# Patient Record
Sex: MALE | Race: WHITE | NOT HISPANIC OR LATINO | Employment: OTHER | URBAN - NONMETROPOLITAN AREA
[De-identification: names, ages, dates, MRNs, and addresses within clinical notes are randomized per-mention and may not be internally consistent; named-entity substitution may affect disease eponyms.]

---

## 2017-02-07 ENCOUNTER — LAB (OUTPATIENT)
Dept: LAB | Facility: HOSPITAL | Age: 72
End: 2017-02-07
Attending: INTERNAL MEDICINE

## 2017-02-07 ENCOUNTER — OFFICE VISIT (OUTPATIENT)
Dept: CARDIOLOGY | Facility: CLINIC | Age: 72
End: 2017-02-07

## 2017-02-07 VITALS
RESPIRATION RATE: 18 BRPM | HEART RATE: 83 BPM | OXYGEN SATURATION: 98 % | DIASTOLIC BLOOD PRESSURE: 77 MMHG | SYSTOLIC BLOOD PRESSURE: 110 MMHG

## 2017-02-07 DIAGNOSIS — E78.5 DYSLIPIDEMIA: ICD-10-CM

## 2017-02-07 DIAGNOSIS — I10 ESSENTIAL HYPERTENSION: ICD-10-CM

## 2017-02-07 DIAGNOSIS — I25.10 ASCVD (ARTERIOSCLEROTIC CARDIOVASCULAR DISEASE): Primary | ICD-10-CM

## 2017-02-07 DIAGNOSIS — I25.10 ASCVD (ARTERIOSCLEROTIC CARDIOVASCULAR DISEASE): ICD-10-CM

## 2017-02-07 DIAGNOSIS — R25.2 CRAMP OF BOTH LOWER EXTREMITIES: ICD-10-CM

## 2017-02-07 DIAGNOSIS — R00.1 BRADYCARDIA: ICD-10-CM

## 2017-02-07 LAB
ALBUMIN SERPL-MCNC: 4.2 G/DL (ref 3.4–4.8)
ALBUMIN/GLOB SERPL: 1.3 G/DL (ref 1.5–2.5)
ALP SERPL-CCNC: 81 U/L (ref 46–116)
ALT SERPL W P-5'-P-CCNC: 24 U/L (ref 10–44)
ANION GAP SERPL CALCULATED.3IONS-SCNC: 6.3 MMOL/L (ref 3.6–11.2)
AST SERPL-CCNC: 20 U/L (ref 10–34)
BILIRUB SERPL-MCNC: 0.5 MG/DL (ref 0.2–1.8)
BUN BLD-MCNC: 23 MG/DL (ref 7–21)
BUN/CREAT SERPL: 17.3 (ref 7–25)
CALCIUM SPEC-SCNC: 9.4 MG/DL (ref 7.7–10)
CHLORIDE SERPL-SCNC: 102 MMOL/L (ref 99–112)
CHOLEST SERPL-MCNC: 225 MG/DL (ref 0–200)
CO2 SERPL-SCNC: 29.7 MMOL/L (ref 24.3–31.9)
CREAT BLD-MCNC: 1.33 MG/DL (ref 0.43–1.29)
GFR SERPL CREATININE-BSD FRML MDRD: 53 ML/MIN/1.73
GLOBULIN UR ELPH-MCNC: 3.3 GM/DL
GLUCOSE BLD-MCNC: 122 MG/DL (ref 70–110)
HDLC SERPL-MCNC: 41 MG/DL (ref 60–100)
LDLC SERPL CALC-MCNC: 134 MG/DL (ref 0–100)
LDLC/HDLC SERPL: 3.26 {RATIO}
OSMOLALITY SERPL CALC.SUM OF ELEC: 280.7 MOSM/KG (ref 273–305)
POTASSIUM BLD-SCNC: 4.3 MMOL/L (ref 3.5–5.3)
PROT SERPL-MCNC: 7.5 G/DL (ref 6–8)
SODIUM BLD-SCNC: 138 MMOL/L (ref 135–153)
TRIGL SERPL-MCNC: 252 MG/DL (ref 0–150)
VLDLC SERPL-MCNC: 50.4 MG/DL

## 2017-02-07 PROCEDURE — 36415 COLL VENOUS BLD VENIPUNCTURE: CPT

## 2017-02-07 PROCEDURE — 93000 ELECTROCARDIOGRAM COMPLETE: CPT | Performed by: INTERNAL MEDICINE

## 2017-02-07 PROCEDURE — 99213 OFFICE O/P EST LOW 20 MIN: CPT | Performed by: INTERNAL MEDICINE

## 2017-02-07 PROCEDURE — 80053 COMPREHEN METABOLIC PANEL: CPT | Performed by: INTERNAL MEDICINE

## 2017-02-07 PROCEDURE — 80061 LIPID PANEL: CPT | Performed by: INTERNAL MEDICINE

## 2017-02-07 RX ORDER — ATORVASTATIN CALCIUM 80 MG/1
80 TABLET, FILM COATED ORAL DAILY
Qty: 30 TABLET | Refills: 11 | Status: SHIPPED | OUTPATIENT
Start: 2017-02-07 | End: 2017-02-07 | Stop reason: SDUPTHER

## 2017-02-07 RX ORDER — ATORVASTATIN CALCIUM 80 MG/1
80 TABLET, FILM COATED ORAL DAILY
Qty: 30 TABLET | Refills: 3 | Status: SHIPPED | OUTPATIENT
Start: 2017-02-07 | End: 2018-04-03 | Stop reason: SDUPTHER

## 2017-02-07 NOTE — PROGRESS NOTES
KARTIK Og  Chi Stanton  : 1945  DATE:2016    Patient Active Problem List   Diagnosis   • Hypertension   • Benign prostatic hyperplasia with urinary obstruction   • Basal cell carcinoma of skin   • Dyslipidemia   • ASCVD (arteriosclerotic cardiovascular disease)   • Bradycardia   • Cramp of both lower extremities       Dear Duran Rm APRN:    Subjective     Chi Stanton is a 71 y.o. male with the above medical problems who is being seen for consultation today follow up.  Current to the patient has been doing well since his last visit.  He denies any chest pain, shortness of breath, palpitations, orthopnea, PND or lower extremity edema.  He was previously seen for chronic bradycardia which his metoprolol was discontinued.  His bradycardia appears to be resolved at this point.  He was also seen for hypertension which appears to be significantly improved after medication adjustment.  He has a history of ASCVD for which she is currently asymptomatic.  He was also seen for leg cramping which appears to be resolved at this point.  He was supposed to have a CMP done which she did not complete.        Current Outpatient Prescriptions:   •  aspirin (ASPIRIN LOW DOSE) 81 MG tablet, Take 81 mg by mouth Daily., Disp: , Rfl:   •  chlorthalidone (HYGROTEN) 25 MG tablet, Take 1 tablet by mouth Daily., Disp: 30 tablet, Rfl: 6  •  levothyroxine (SYNTHROID, LEVOTHROID) 75 MCG tablet, Take 75 mcg by mouth Daily., Disp: , Rfl:   •  pravastatin (PRAVACHOL) 40 MG tablet, Take 40 mg by mouth Daily., Disp: , Rfl:   •  ramipril (ALTACE) 10 MG capsule, Ramipril 10 MG Oral Capsule; Patient Sig: Ramipril 10 MG Oral Capsule TAKE 1 CAPSULE EVERY 12 HOURS DAILY.; 0; -2015; Active, Disp: , Rfl:     The following portions of the patient's history were reviewed and updated as appropriate: allergies, current medications, past family history, past medical history, past social history, past surgical  history and problem list.    Review of Systems   Constitution: Negative for diaphoresis, fever, weakness and malaise/fatigue.   HENT: Positive for congestion and hearing loss. Negative for ear pain, headaches, hoarse voice and nosebleeds.    Eyes: Negative for blurred vision, discharge, pain and redness.   Cardiovascular: Negative for chest pain, dyspnea on exertion, irregular heartbeat, leg swelling and palpitations.   Respiratory: Negative for cough, hemoptysis, shortness of breath and wheezing.    Endocrine: Negative for cold intolerance and heat intolerance.   Hematologic/Lymphatic: Does not bruise/bleed easily.   Skin: Positive for skin cancer (neck, arm, lip). Negative for rash.   Musculoskeletal: Positive for gout (Lt great toe ) and joint swelling (with prolonged sitting). Negative for arthritis, back pain, joint pain, muscle cramps, muscle weakness, neck pain and stiffness.   Gastrointestinal: Negative for abdominal pain, change in bowel habit, constipation, diarrhea, heartburn, hematemesis, hematochezia, melena, nausea and vomiting.   Genitourinary: Positive for nocturia (couple times / night). Negative for dysuria, frequency and hematuria.   Neurological: Negative for dizziness, focal weakness and numbness.   Psychiatric/Behavioral: Positive for depression. The patient is not nervous/anxious.    Allergic/Immunologic: Negative for hives.       Objective   Blood pressure 110/77, pulse 83, resp. rate 18, SpO2 98 %.    Physical Exam   Constitutional: He is oriented to person, place, and time. He appears well-developed and well-nourished.   WM sitting comfortably on chair.   HENT:   Mouth/Throat: Oropharynx is clear and moist.   Eyes: EOM are normal. Pupils are equal, round, and reactive to light.   Neck: Neck supple. No JVD present. No tracheal deviation present. No thyromegaly present.   Cardiovascular: Normal rate, regular rhythm, S1 normal and S2 normal.  Exam reveals no gallop and no friction rub.    No  murmur heard.  Pulmonary/Chest: Effort normal and breath sounds normal. No respiratory distress. He has no wheezes. He has no rales.   Abdominal: Soft. Bowel sounds are normal. He exhibits no mass. There is no tenderness.   Musculoskeletal: Normal range of motion. He exhibits no edema.   Lymphadenopathy:     He has no cervical adenopathy.   Neurological: He is alert and oriented to person, place, and time.   Skin: Skin is warm and dry. No rash noted.   Psychiatric: He has a normal mood and affect.         ECG 12 Lead  Date/Time: 2/7/2017 9:48 AM  Performed by: LISA AGUIRRE  Authorized by: LISA AGUIRRE   Comparison: compared with previous ECG from 11/8/2016  Comparison to previous ECG: Patient now in NSR  Rhythm: sinus rhythm  Rate: normal  BPM: 76  Conduction: conduction normal  ST Segments: ST segments normal  T Waves: T waves normal  QRS axis: normal  Q waves: II, III and aVF  Clinical impression: non-specific ECG            Assessment/Plan      1.  ASCVD: Patient with history of ASCVD status post coronary artery bypass grafting 14 years ago.  At this point he denies any chest pain, shortness of breath or palpitations.  He is currently on aspirin, ACE inhibitor and statin.  His beta blocker was discontinued in light of his bradycardia with significant improvement.  At this point would continue current regimen as he remains asymptomatic.  He did not complete previously ordered CMP.  We will reorder.    2.  Bradycardia: Now resolved after discontinuing beta blocker.  We will continue current regimen and monitor.    3.  Hypertension: The patient's blood pressure is now well controlled on current regimen.  At this point will continue.    4.  Dyslipidemia: Patient with history of dyslipidemia with no recent lipid panel record.  He did not complete the previously ordered lipid panel.  At this point will reorder.    5.  Lower extremity cramping: The patient's leg cramping is now  resolved.  At this point we'll continue to monitor.     Diagnosis Plan   1. ASCVD (arteriosclerotic cardiovascular disease)     2. Bradycardia     3. Essential hypertension     4. Dyslipidemia     5. Cramp of both lower extremities              Return in about 3 months (around 5/7/2017).    I appreciate the opportunity to participate in this patient's cardiovascular care.    Best Regards    Dilan Maldonado

## 2017-05-09 ENCOUNTER — OFFICE VISIT (OUTPATIENT)
Dept: CARDIOLOGY | Facility: CLINIC | Age: 72
End: 2017-05-09

## 2017-05-09 VITALS
DIASTOLIC BLOOD PRESSURE: 83 MMHG | HEART RATE: 44 BPM | HEIGHT: 76 IN | SYSTOLIC BLOOD PRESSURE: 139 MMHG | BODY MASS INDEX: 28.67 KG/M2 | WEIGHT: 235.4 LBS

## 2017-05-09 DIAGNOSIS — R00.1 BRADYCARDIA: ICD-10-CM

## 2017-05-09 DIAGNOSIS — I10 ESSENTIAL HYPERTENSION: Primary | ICD-10-CM

## 2017-05-09 DIAGNOSIS — E78.5 DYSLIPIDEMIA: ICD-10-CM

## 2017-05-09 DIAGNOSIS — I25.10 ASCVD (ARTERIOSCLEROTIC CARDIOVASCULAR DISEASE): ICD-10-CM

## 2017-05-09 PROCEDURE — 99213 OFFICE O/P EST LOW 20 MIN: CPT | Performed by: INTERNAL MEDICINE

## 2017-05-09 PROCEDURE — 93000 ELECTROCARDIOGRAM COMPLETE: CPT | Performed by: INTERNAL MEDICINE

## 2017-05-09 RX ORDER — AMLODIPINE BESYLATE 5 MG/1
5 TABLET ORAL
Status: DISCONTINUED | OUTPATIENT
Start: 2017-05-09 | End: 2017-08-08

## 2017-05-23 RX ORDER — CHLORTHALIDONE 25 MG/1
TABLET ORAL
Qty: 90 TABLET | Refills: 6 | Status: SHIPPED | OUTPATIENT
Start: 2017-05-23 | End: 2018-09-19 | Stop reason: SDUPTHER

## 2017-08-08 ENCOUNTER — OFFICE VISIT (OUTPATIENT)
Dept: CARDIOLOGY | Facility: CLINIC | Age: 72
End: 2017-08-08

## 2017-08-08 VITALS
BODY MASS INDEX: 28.86 KG/M2 | WEIGHT: 237 LBS | HEART RATE: 52 BPM | HEIGHT: 76 IN | DIASTOLIC BLOOD PRESSURE: 79 MMHG | RESPIRATION RATE: 16 BRPM | SYSTOLIC BLOOD PRESSURE: 125 MMHG

## 2017-08-08 DIAGNOSIS — I25.10 ASCVD (ARTERIOSCLEROTIC CARDIOVASCULAR DISEASE): ICD-10-CM

## 2017-08-08 DIAGNOSIS — E78.5 DYSLIPIDEMIA: ICD-10-CM

## 2017-08-08 DIAGNOSIS — R00.1 BRADYCARDIA: ICD-10-CM

## 2017-08-08 DIAGNOSIS — I10 ESSENTIAL HYPERTENSION: Primary | ICD-10-CM

## 2017-08-08 PROCEDURE — 99212 OFFICE O/P EST SF 10 MIN: CPT | Performed by: INTERNAL MEDICINE

## 2017-08-08 NOTE — PROGRESS NOTES
KARTIK Og  Chi Stanton  : 1945  DATE:2016    Patient Active Problem List   Diagnosis   • Hypertension   • Benign prostatic hyperplasia with urinary obstruction   • Basal cell carcinoma of skin   • Dyslipidemia   • ASCVD (arteriosclerotic cardiovascular disease)   • Bradycardia   • Cramp of both lower extremities       Dear Duran Rm, APRN:    Subjective     Chi Stanton is a 71 y.o. male with the above medical problems who is being seen for follow up. The patient states he has been doing well since his last visit and denies any symptoms at this point.  He denies any chest pain, shortness breath, palpitations, orthopnea, PND, nausea and edema, dizziness or syncope.  He has a history of bradycardia but this is noted to have improved after discontinuing metoprolol.      Current Outpatient Prescriptions:   •  aspirin (ASPIRIN LOW DOSE) 81 MG tablet, Take 81 mg by mouth Daily., Disp: , Rfl:   •  atorvastatin (LIPITOR) 80 MG tablet, Take 1 tablet by mouth Daily., Disp: 30 tablet, Rfl: 3  •  chlorthalidone (HYGROTON) 25 MG tablet, TAKE 1 TABLET EVERY DAY, Disp: 90 tablet, Rfl: 6  •  levothyroxine (SYNTHROID, LEVOTHROID) 75 MCG tablet, Take 75 mcg by mouth Daily., Disp: , Rfl:   •  ramipril (ALTACE) 10 MG capsule, Ramipril 10 MG Oral Capsule; Patient Sig: Ramipril 10 MG Oral Capsule TAKE 1 CAPSULE EVERY 12 HOURS DAILY.; 0; -2015; Active, Disp: , Rfl:     Current Facility-Administered Medications:   •  amLODIPine (NORVASC) tablet 5 mg, 5 mg, Oral, Q24H, Dilan Herring MD    The following portions of the patient's history were reviewed and updated as appropriate: allergies, current medications, past family history, past medical history, past social history, past surgical history and problem list.    Review of Systems   Constitution: Negative for chills and fever.   HENT: Negative for headaches, nosebleeds and sore throat.    Eyes: Negative for blurred  "vision, pain and redness.   Cardiovascular: Negative for chest pain, leg swelling, near-syncope, orthopnea, palpitations and syncope.   Respiratory: Negative for cough, hemoptysis, shortness of breath and wheezing.    Musculoskeletal: Negative for arthritis, back pain, joint pain, joint swelling, myalgias and stiffness.   Gastrointestinal: Negative for abdominal pain, constipation, hematemesis, hematochezia, melena, nausea and vomiting.   Genitourinary: Negative for dysuria and hematuria.   Neurological: Negative for dizziness.   Psychiatric/Behavioral: Negative for depression. The patient is not nervous/anxious.        Objective   Blood pressure 125/79, pulse 52, resp. rate 16, height 76\" (193 cm), weight 237 lb (108 kg).    Physical Exam   Constitutional: He is oriented to person, place, and time. He appears well-developed and well-nourished.   WM sitting comfortably on chair.   HENT:   Mouth/Throat: Oropharynx is clear and moist.   Eyes: EOM are normal. Pupils are equal, round, and reactive to light.   Neck: Neck supple. No JVD present. No tracheal deviation present. No thyromegaly present.   Cardiovascular: Regular rhythm, S1 normal and S2 normal.  Bradycardia present.  Exam reveals no gallop and no friction rub.    No murmur heard.  Pulmonary/Chest: Effort normal and breath sounds normal. No respiratory distress. He has no wheezes. He has no rales.   Abdominal: Soft. Bowel sounds are normal. He exhibits no mass. There is no tenderness.   Musculoskeletal: Normal range of motion. He exhibits no edema.   Lymphadenopathy:     He has no cervical adenopathy.   Neurological: He is alert and oriented to person, place, and time.   Skin: Skin is warm and dry. No rash noted.   Psychiatric: He has a normal mood and affect.       Procedures    Assessment/Plan      1.  ASCVD: Patient with history of ASCVD status post coronary artery bypass grafting 14 years ago.  At this point he denies any chest pain, shortness of breath or " palpitations.  He is currently on aspirin, ACE inhibitor and statin.  This point will continue to hold metoprolol due to recent bradycardia.    2.  Bradycardia: Patient with a long history of bradycardia that appears to have improved after discontinuing metoprolol.  At this point we'll continue to hold.    3.  Hypertension: Patient with a history of hypertension that appears to be well-controlled on current regimen.  At this point will continue.    4.  Dyslipidemia: Patient with history of dyslipidemia.  We will check lipid panel.     Diagnosis Plan   1. Essential hypertension     2. Bradycardia     3. ASCVD (arteriosclerotic cardiovascular disease)     4. Dyslipidemia  Lipid Panel            Return in about 6 months (around 2/8/2018).    I appreciate the opportunity to participate in this patient's cardiovascular care.    Best Regards    Dilan Maldonado

## 2017-10-06 ENCOUNTER — DOCUMENTATION (OUTPATIENT)
Dept: CARDIOLOGY | Facility: CLINIC | Age: 72
End: 2017-10-06

## 2018-03-14 ENCOUNTER — OFFICE VISIT (OUTPATIENT)
Dept: SURGERY | Facility: CLINIC | Age: 73
End: 2018-03-14

## 2018-03-14 VITALS — BODY MASS INDEX: 29.22 KG/M2 | WEIGHT: 240 LBS | HEIGHT: 76 IN

## 2018-03-14 DIAGNOSIS — C44.519 BASAL CELL CARCINOMA OF SKIN OF OTHER PART OF TRUNK: Primary | ICD-10-CM

## 2018-03-14 PROCEDURE — 99213 OFFICE O/P EST LOW 20 MIN: CPT | Performed by: SURGERY

## 2018-03-16 NOTE — PROGRESS NOTES
Rony Stanton is a 72 y.o. male is being seen for consultation today at the request of Duran Rm    72 y.o. male presenting for evaluation of skin lesion. Lesion on Left ear tragus, face, left chest, left neck with patient's observations stated as increasing diameter, increasing thickness, exam of this area shows possible basal cell carcinoma, features Ulcerated irregularly bordered red lesions in all locations., size Of all lesions is 10 mm.    Past Medical History:   Diagnosis Date   • Hypertension        History reviewed. No pertinent family history.    Social History     Social History   • Marital status:      Spouse name: N/A   • Number of children: N/A   • Years of education: N/A     Occupational History   • Not on file.     Social History Main Topics   • Smoking status: Former Smoker   • Smokeless tobacco: Never Used      Comment: QUIT FOR 20 YEARS   • Alcohol use No   • Drug use: No   • Sexual activity: Defer     Other Topics Concern   • Not on file     Social History Narrative   • No narrative on file       Past Surgical History:   Procedure Laterality Date   • BYPASS GRAFT     • CORONARY ARTERY BYPASS GRAFT     • SKIN CANCER EXCISION      Lip, Neck, Arm        The following portions of the patient's history were reviewed and updated as appropriate: allergies, current medications, past family history, past medical history, past social history, past surgical history and problem list.    Review of Systems   Constitutional: Negative for activity change, appetite change, chills and fever.   HENT: Negative for sore throat and trouble swallowing.    Eyes: Negative for visual disturbance.   Respiratory: Negative for cough and shortness of breath.    Cardiovascular: Negative for chest pain and palpitations.   Gastrointestinal: Negative for abdominal distention, abdominal pain, blood in stool, constipation, diarrhea, nausea and vomiting.   Endocrine: Negative for cold intolerance and heat  "intolerance.   Genitourinary: Negative for dysuria.   Musculoskeletal: Negative for joint swelling.   Skin: Negative for color change, rash and wound.   Allergic/Immunologic: Negative for immunocompromised state.   Neurological: Negative for dizziness, seizures, weakness and headaches.   Hematological: Negative for adenopathy. Does not bruise/bleed easily.   Psychiatric/Behavioral: Negative for agitation and confusion.         Ht 193 cm (76\")   Wt 109 kg (240 lb)   BMI 29.21 kg/m²   Objective   Physical Exam   Constitutional: He is oriented to person, place, and time. He appears well-developed and well-nourished.   HENT:   Head: Normocephalic and atraumatic.   Mouth/Throat: Mucous membranes are normal.   Eyes: Conjunctivae and EOM are normal. Pupils are equal, round, and reactive to light. No scleral icterus.   Neck: Neck supple. No JVD present. No tracheal deviation present. No thyromegaly present.   Cardiovascular: Normal rate and regular rhythm.  Exam reveals no gallop and no friction rub.    No murmur heard.  Pulmonary/Chest: Effort normal and breath sounds normal. He exhibits no mass, no tenderness and no retraction. Right breast exhibits no mass, no nipple discharge, no skin change and no tenderness. Left breast exhibits no mass, no nipple discharge, no skin change and no tenderness. Breasts are symmetrical.   Abdominal: Soft. Bowel sounds are normal. He exhibits no distension and no mass. There is no splenomegaly or hepatomegaly. There is no tenderness. No hernia.   Musculoskeletal: Normal range of motion. He exhibits no deformity.   Lymphadenopathy:     He has no cervical adenopathy.     He has no axillary adenopathy.   Neurological: He is alert and oriented to person, place, and time.   Skin: Skin is warm and dry. No rash noted.   Multiple suspicious basal cell carcinomas already 1 cm of the left tragus left neck left chest and face   Psychiatric: He has a normal mood and affect.   Vitals " reviewed.        Chi was seen today for multiple skin lesion.    Diagnoses and all orders for this visit:    Basal cell carcinoma of skin of other part of trunk        Assessment     72-year-old male with multiple skin lesions concerning for malignancy with 1 biopsy-proven basal cell carcinoma.  The lesion on the ear at the tragus will be a complex excision and he'll be referred to her nose and throat for evaluation of this prior to removal of the other lesions.  Discussed the patient's BMI with him. BMI is within normal parameters. No follow-up required.

## 2018-03-21 ENCOUNTER — PROCEDURE VISIT (OUTPATIENT)
Dept: SURGERY | Facility: CLINIC | Age: 73
End: 2018-03-21

## 2018-03-21 VITALS — HEIGHT: 76 IN | WEIGHT: 240 LBS | BODY MASS INDEX: 29.22 KG/M2

## 2018-03-21 DIAGNOSIS — C44.519 BASAL CELL CARCINOMA OF SKIN OF OTHER PART OF TRUNK: Primary | ICD-10-CM

## 2018-03-21 PROCEDURE — 11100 PR BIOPSY OF SKIN LESION: CPT | Performed by: SURGERY

## 2018-03-21 PROCEDURE — 11101 PR BIOPSY, EACH ADDED LESION: CPT | Performed by: SURGERY

## 2018-03-26 ENCOUNTER — TELEPHONE (OUTPATIENT)
Dept: CARDIOLOGY | Facility: CLINIC | Age: 73
End: 2018-03-26

## 2018-03-27 ENCOUNTER — OFFICE VISIT (OUTPATIENT)
Dept: SURGERY | Facility: CLINIC | Age: 73
End: 2018-03-27

## 2018-03-27 ENCOUNTER — TELEPHONE (OUTPATIENT)
Dept: SURGERY | Facility: CLINIC | Age: 73
End: 2018-03-27

## 2018-03-27 ENCOUNTER — LAB (OUTPATIENT)
Dept: LAB | Facility: HOSPITAL | Age: 73
End: 2018-03-27
Attending: INTERNAL MEDICINE

## 2018-03-27 VITALS — HEIGHT: 76 IN | BODY MASS INDEX: 29.22 KG/M2 | WEIGHT: 240 LBS

## 2018-03-27 DIAGNOSIS — E78.5 DYSLIPIDEMIA: ICD-10-CM

## 2018-03-27 DIAGNOSIS — C44.519 BASAL CELL CARCINOMA OF SKIN OF OTHER PART OF TRUNK: Primary | ICD-10-CM

## 2018-03-27 LAB
CHOLEST SERPL-MCNC: 110 MG/DL (ref 0–200)
HDLC SERPL-MCNC: 28 MG/DL (ref 60–100)
LDLC SERPL CALC-MCNC: 39 MG/DL (ref 0–100)
LDLC/HDLC SERPL: 1.38 {RATIO}
TRIGL SERPL-MCNC: 217 MG/DL (ref 0–150)
VLDLC SERPL-MCNC: 43.4 MG/DL

## 2018-03-27 PROCEDURE — 99212 OFFICE O/P EST SF 10 MIN: CPT | Performed by: SURGERY

## 2018-03-27 PROCEDURE — 80061 LIPID PANEL: CPT

## 2018-03-27 PROCEDURE — 36415 COLL VENOUS BLD VENIPUNCTURE: CPT

## 2018-03-27 NOTE — PROGRESS NOTES
Subjective   Chi Stanton is a 72 y.o. male  is here today for follow-up.         Chi Stanton is a 72 y.o. male here for follow up after Punch biopsy of multiple skin lesions of the face and trunk.  All lesions of come back as basal cell carcinoma and will require excision.  No change in the lesions in the biopsy sites of healed well.    Physical Examination:  Left cheek, right forehead, left chest, right forearm, and right upper back lesions consistent with basal Cell Carcinoma.  Biopsy Sites Well-Healed.      Chi was seen today for suture / staple removal and pathology review.    Diagnoses and all orders for this visit:    Basal cell carcinoma of skin of other part of trunk        Assessment     Chi Stanton is a 72 y.o. male with biopsy-proven basal cell carcinoma of multiple sites.  He will have in office excision in 2 weeks.

## 2018-03-27 NOTE — TELEPHONE ENCOUNTER
Patient's spouse was aware of his appointment with ENT for removal of other ear lesions. Appointment was made for 4/2/18 @ 11:15, phone number [phone] was given to the patient as well as address.

## 2018-04-04 RX ORDER — ATORVASTATIN CALCIUM 80 MG/1
TABLET, FILM COATED ORAL
Qty: 90 TABLET | Refills: 11 | Status: SHIPPED | OUTPATIENT
Start: 2018-04-04 | End: 2018-11-02 | Stop reason: SDUPTHER

## 2018-04-11 ENCOUNTER — PROCEDURE VISIT (OUTPATIENT)
Dept: SURGERY | Facility: CLINIC | Age: 73
End: 2018-04-11

## 2018-04-11 VITALS — HEIGHT: 76 IN | BODY MASS INDEX: 29.22 KG/M2 | WEIGHT: 240 LBS

## 2018-04-11 DIAGNOSIS — C44.519 BASAL CELL CARCINOMA OF SKIN OF OTHER PART OF TRUNK: Primary | ICD-10-CM

## 2018-04-11 PROCEDURE — 11604 EXC TR-EXT MAL+MARG 3.1-4 CM: CPT | Performed by: SURGERY

## 2018-04-11 PROCEDURE — 11622 EXC S/N/H/F/G MAL+MRG 1.1-2: CPT | Performed by: SURGERY

## 2018-04-11 PROCEDURE — 11642 EXC F/E/E/N/L MAL+MRG 1.1-2: CPT | Performed by: SURGERY

## 2018-04-14 NOTE — PROGRESS NOTES
Excision of multiple skin lesions (left chest, left eye, right forehead)    Patient face and left chest prepped and draped in sterile fashion.  At all sites elliptical incisions with 3mm margins were made and the skin and subcutaneous fat were excised.  The measurements of the lesions were (left chest 1.5cm, left eye 8mm, left scalp 8mm).  The incisions were hemostatic and closed with running nylon suture.  The patient was dressed with bacitracin and band aids.    Complications: none  -  EBL: 10mL    Specimens: left chest skin lesion                      Left cheek skin lesion                      Right scalp skin lesion

## 2018-04-17 RX ORDER — SULFAMETHOXAZOLE AND TRIMETHOPRIM 800; 160 MG/1; MG/1
1 TABLET ORAL 2 TIMES DAILY
Qty: 20 TABLET | Refills: 0 | Status: SHIPPED | OUTPATIENT
Start: 2018-04-17 | End: 2018-07-24

## 2018-04-18 ENCOUNTER — OFFICE VISIT (OUTPATIENT)
Dept: SURGERY | Facility: CLINIC | Age: 73
End: 2018-04-18

## 2018-04-18 VITALS — BODY MASS INDEX: 29.22 KG/M2 | HEIGHT: 76 IN | WEIGHT: 240 LBS

## 2018-04-18 DIAGNOSIS — C44.519 BASAL CELL CARCINOMA OF SKIN OF OTHER PART OF TRUNK: Primary | ICD-10-CM

## 2018-04-18 PROCEDURE — 99024 POSTOP FOLLOW-UP VISIT: CPT | Performed by: SURGERY

## 2018-04-19 NOTE — PROGRESS NOTES
Subjective   Chi Stanton is a 72 y.o. male  is here today for follow-up.         Chi Stanton is a 72 y.o. male here for follow up after excision of multiple basal cell carcinoma of the skin.  He is healing well at the fascial lesions and right chest lesion.  The left chest lesion which was the largest of the 4 was unfortunately developed infection.  The sutures been removed and the wound is been opened and purulence drained.  He has no systemic symptoms concerning for sepsis or bacteremia.          Chi was seen today for pathology review and post skin lesion excision.    Diagnoses and all orders for this visit:    Basal cell carcinoma of skin of other part of trunk        Assessment     Chi Stanton is a 72 y.o. male status post excision of multiple basal cell carcinoma.  All margins are negative.  The largest of the incisions was in the left chest and has developed subcutaneous infection requiring opening of the wound with irrigation.  No systemic symptoms evident.  He will pack the wound twice daily and follow-up as scheduled for suture removal of the remaining lesions.  All margins were negative and he will be initiated on Bactrim therapy.

## 2018-04-25 ENCOUNTER — OFFICE VISIT (OUTPATIENT)
Dept: SURGERY | Facility: CLINIC | Age: 73
End: 2018-04-25

## 2018-04-25 VITALS — WEIGHT: 241 LBS | BODY MASS INDEX: 29.35 KG/M2 | HEIGHT: 76 IN

## 2018-04-25 DIAGNOSIS — C44.519 BASAL CELL CARCINOMA OF SKIN OF OTHER PART OF TRUNK: Primary | ICD-10-CM

## 2018-04-25 PROCEDURE — 99212 OFFICE O/P EST SF 10 MIN: CPT | Performed by: SURGERY

## 2018-04-25 PROCEDURE — 17110 DESTRUCTION B9 LES UP TO 14: CPT | Performed by: SURGERY

## 2018-04-25 NOTE — PROGRESS NOTES
Subjective   Chi Stanton is a 72 y.o. male  is here today for follow-up.         Chi Stanton is a 72 y.o. male here for follow up after excision of multiple basal cell carcinoma of the skin.  He is healing well at the fascial lesions and right chest lesion.  The patient's open right wound is granulating nicely.  The other wounds of healed well and sutures of been removed in the office today.  With regard to the 2 concerning lesions on the back we will attempt cryotherapy prior to excision as they appear to be representative of actinic keratosis.    CryoPen therapy ×2    At the site of the bilateral upper back actinic keratosis CryoPen therapy was applied for 45 seconds to both lesions.    Chi was seen today for wound care.    Diagnoses and all orders for this visit:    Basal cell carcinoma of skin of other part of trunk        Assessment     Chi Stanton is a 72 y.o. male status post excision of multiple basal cell carcinoma.  All margins are negative.  CryoPen therapy is been applied to the 2 wounds of the upper back and he will continue packing the left chest wound.  Follow-up in 1 month.

## 2018-05-30 ENCOUNTER — OFFICE VISIT (OUTPATIENT)
Dept: SURGERY | Facility: CLINIC | Age: 73
End: 2018-05-30

## 2018-05-30 VITALS — BODY MASS INDEX: 29.35 KG/M2 | WEIGHT: 241 LBS | HEIGHT: 76 IN

## 2018-05-30 DIAGNOSIS — C44.519 BASAL CELL CARCINOMA OF SKIN OF OTHER PART OF TRUNK: Primary | ICD-10-CM

## 2018-05-30 PROCEDURE — 99212 OFFICE O/P EST SF 10 MIN: CPT | Performed by: SURGERY

## 2018-06-01 NOTE — PROGRESS NOTES
Subjective   Chi Stanton is a 72 y.o. male  is here today for follow-up.         Chi Stanton is a 72 y.o. male here for follow up after excision of multiple basal cell carcinoma of the skin.  He is healing well at the fascial lesions and right chest lesion.   No evidence of local recurrence.  He is had complete granulation of his open right chest wound.  Previous areas of cryotherapy are improving.   Chi was seen today for basal cell carcinoma.    Diagnoses and all orders for this visit:    Basal cell carcinoma of skin of other part of trunk        Assessment     Chi Stanton is a 72 y.o. male status post excision of multiple basal cell carcinoma.  All margins are negative.  Follow-up in 1 month.

## 2018-07-24 ENCOUNTER — OFFICE VISIT (OUTPATIENT)
Dept: CARDIOLOGY | Facility: CLINIC | Age: 73
End: 2018-07-24

## 2018-07-24 VITALS
OXYGEN SATURATION: 98 % | DIASTOLIC BLOOD PRESSURE: 71 MMHG | RESPIRATION RATE: 16 BRPM | BODY MASS INDEX: 29.96 KG/M2 | HEIGHT: 76 IN | WEIGHT: 246 LBS | HEART RATE: 49 BPM | SYSTOLIC BLOOD PRESSURE: 105 MMHG

## 2018-07-24 DIAGNOSIS — E78.5 DYSLIPIDEMIA: ICD-10-CM

## 2018-07-24 DIAGNOSIS — I25.10 ASCVD (ARTERIOSCLEROTIC CARDIOVASCULAR DISEASE): Primary | ICD-10-CM

## 2018-07-24 DIAGNOSIS — R00.1 BRADYCARDIA: ICD-10-CM

## 2018-07-24 DIAGNOSIS — I10 BENIGN ESSENTIAL HYPERTENSION: ICD-10-CM

## 2018-07-24 PROBLEM — R97.20 ELEVATED PROSTATE SPECIFIC ANTIGEN (PSA): Status: ACTIVE | Noted: 2018-07-24

## 2018-07-24 PROBLEM — L03.211 CELLULITIS AND ABSCESS OF FACE: Status: ACTIVE | Noted: 2018-07-24

## 2018-07-24 PROBLEM — E03.9 HYPOTHYROIDISM: Status: ACTIVE | Noted: 2018-07-24

## 2018-07-24 PROBLEM — L02.01 CELLULITIS AND ABSCESS OF FACE: Status: ACTIVE | Noted: 2018-07-24

## 2018-07-24 PROBLEM — K21.9 ESOPHAGEAL REFLUX: Status: ACTIVE | Noted: 2018-07-24

## 2018-07-24 PROBLEM — IMO0002 CELLULITIS AND ABSCESS OF DIGIT: Status: ACTIVE | Noted: 2018-07-24

## 2018-07-24 PROBLEM — M10.072 ACUTE IDIOPATHIC GOUT INVOLVING TOE OF LEFT FOOT: Status: ACTIVE | Noted: 2018-07-24

## 2018-07-24 PROBLEM — I25.83 CORONARY ATHEROSCLEROSIS DUE TO LIPID RICH PLAQUE: Status: ACTIVE | Noted: 2018-07-24

## 2018-07-24 PROCEDURE — 93000 ELECTROCARDIOGRAM COMPLETE: CPT | Performed by: INTERNAL MEDICINE

## 2018-07-24 PROCEDURE — 99213 OFFICE O/P EST LOW 20 MIN: CPT | Performed by: INTERNAL MEDICINE

## 2018-07-24 RX ORDER — FAMOTIDINE 20 MG/1
20 TABLET, FILM COATED ORAL EVERY 24 HOURS
COMMUNITY

## 2018-07-24 RX ORDER — CHLORAL HYDRATE 500 MG
1000 CAPSULE ORAL
Qty: 90 CAPSULE | Refills: 6 | Status: ON HOLD | OUTPATIENT
Start: 2018-07-24 | End: 2019-04-27

## 2018-07-24 NOTE — PROGRESS NOTES
KARTIK Og  Chi Stanton  : 1945  DATE:2016    Patient Active Problem List   Diagnosis   • Hypertension   • Benign prostatic hyperplasia with urinary obstruction   • Basal cell carcinoma of skin   • Dyslipidemia   • ASCVD (arteriosclerotic cardiovascular disease)   • Bradycardia   • Cramp of both lower extremities   • Bradycardia   • Hypothyroidism   • Acute idiopathic gout involving toe of left foot   • Atherosclerotic heart disease of native coronary artery without angina pectoris   • Benign essential hypertension   • Abnormal blood chemistry level   • Body mass index 29.0-29.9, adult   • Coronary atherosclerosis   • Cellulitis and abscess of face   • Cellulitis and abscess of digit   • Coronary atherosclerosis due to lipid rich plaque   • Dysuria   • Elevated prostate specific antigen (PSA)   • Gastroesophageal reflux disease   • Esophageal reflux   • Gout   • Hearing loss       Dear KARTIK Og:    Subjective     Chi Stanton is a 72 y.o. male with the above medical problems who is being seen for follow up.  According to the patient has been doing fairly well since last visit.  He denies any chest pain, palpitations, orthopnea, PND, lower extremity edema, dizziness or syncope.  He does complain of some shortness of breath appears to be related to his history of tobacco use.  He did undergo an echocardiogram which showed a normal ejection fraction and no wall motion abnormalities.      Current Outpatient Prescriptions:   •  aspirin (ASPIRIN LOW DOSE) 81 MG tablet, Take 81 mg by mouth Daily., Disp: , Rfl:   •  atorvastatin (LIPITOR) 80 MG tablet, TAKE 1 TABLET EVERY DAY, Disp: 90 tablet, Rfl: 11  •  chlorthalidone (HYGROTON) 25 MG tablet, TAKE 1 TABLET EVERY DAY, Disp: 90 tablet, Rfl: 6  •  famotidine (PEPCID) 20 MG tablet, Take 20 mg by mouth Daily., Disp: , Rfl:   •  levothyroxine (SYNTHROID, LEVOTHROID) 75 MCG tablet, Take 88 mcg by mouth Daily., Disp: , Rfl:  "  •  ramipril (ALTACE) 10 MG capsule, Ramipril 10 MG Oral Capsule; Patient Sig: Ramipril 10 MG Oral Capsule TAKE 1 CAPSULE EVERY 12 HOURS DAILY.; 0; 12-Feb-2015; Active, Disp: , Rfl:   •  Omega-3 Fatty Acids (FISH OIL) 1000 MG capsule capsule, Take 1 capsule by mouth 3 (Three) Times a Day With Meals., Disp: 90 capsule, Rfl: 6    The following portions of the patient's history were reviewed and updated as appropriate: allergies, current medications, past family history, past medical history, past social history, past surgical history and problem list.    Review of Systems   Constitution: Negative for chills, diaphoresis, fever and malaise/fatigue.   HENT: Positive for hearing loss.    Eyes: Negative for blurred vision, pain and redness.        Uses readers     Cardiovascular: Positive for dyspnea on exertion (walking long distances sometimes). Negative for chest pain, irregular heartbeat, leg swelling, orthopnea, palpitations and paroxysmal nocturnal dyspnea.   Respiratory: Negative for cough, hemoptysis and shortness of breath.    Endocrine: Negative for cold intolerance and heat intolerance.   Hematologic/Lymphatic: Does not bruise/bleed easily.   Skin: Negative for rash.   Musculoskeletal: Negative for arthritis, back pain, joint pain, joint swelling, myalgias and stiffness.   Gastrointestinal: Negative for abdominal pain, constipation, diarrhea, hematemesis, hematochezia, melena, nausea and vomiting.   Genitourinary: Negative for dysuria and hematuria.   Neurological: Negative for dizziness, focal weakness and numbness.   Psychiatric/Behavioral: Negative for depression. The patient is not nervous/anxious.        Objective   Blood pressure 105/71, pulse (!) 49, resp. rate 16, height 193 cm (76\"), weight 112 kg (246 lb), SpO2 98 %.    Physical Exam   Constitutional: He is oriented to person, place, and time. He appears well-developed and well-nourished.   WM sitting comfortably on chair.   HENT:   Mouth/Throat: " Oropharynx is clear and moist.   Eyes: Pupils are equal, round, and reactive to light. EOM are normal.   Neck: Neck supple. No JVD present. No tracheal deviation present. No thyromegaly present.   Cardiovascular: Regular rhythm, S1 normal and S2 normal.  Bradycardia present.  Exam reveals no gallop and no friction rub.    No murmur heard.  Pulmonary/Chest: Effort normal and breath sounds normal. No respiratory distress. He has no wheezes. He has no rales.   Abdominal: Soft. Bowel sounds are normal. He exhibits no mass. There is no tenderness.   Musculoskeletal: Normal range of motion. He exhibits no edema.   Lymphadenopathy:     He has no cervical adenopathy.   Neurological: He is alert and oriented to person, place, and time.   Skin: Skin is warm and dry. No rash noted.   Psychiatric: He has a normal mood and affect.         ECG 12 Lead  Date/Time: 7/24/2018 8:20 AM  Performed by: LISA AGUIRRE  Authorized by: LISA AGUIRRE   Comparison: compared with previous ECG from 5/9/2017  Similar to previous ECG  Rhythm: sinus bradycardia  Rate: bradycardic  BPM: 47  Conduction: conduction normal  ST Segments: ST segments normal  T Waves: T waves normal  QRS axis: normal  Other: no other findings  Clinical impression: abnormal ECG            Assessment/Plan      1.  ASCVD: Patient with history of ASCVD status post coronary artery bypass grafting 14 years ago.  At this point he denies any chest pain  or palpitations.  He is currently on aspirin, ACE inhibitor and statin.  He was supposed to discontinue metoprolol on last visit but he has continued this medication.  This point we will once again hold the metoprolol.    2.  Bradycardia: The patient resumed taking metoprolol for an unclear reason.  He is now once again bradycardic.  We'll once again discontinue metoprolol at this point.    3.  Hypertension: Patient with a history of hypertension that appears to be well-controlled on current  regimen.  At this point will continue.    4.  Dyslipidemia: Patient with history of dyslipidemia recent lipid panel showing elevated triglycerides.  At this point will start on fish.     Diagnosis Plan   1. ASCVD (arteriosclerotic cardiovascular disease)     2. Bradycardia     3. Benign essential hypertension     4. Dyslipidemia          Return in about 3 months (around 10/24/2018).    I appreciate the opportunity to participate in this patient's cardiovascular care.    Best Regards    Dilan Maldonado

## 2018-08-15 NOTE — PROGRESS NOTES
4 mm punch biopsy several skin lesions of the head and neck and trunk.    The patient forehead, left cheek, left chest, and right upper back were prepped and draped in usual sterile fashion.  Timeout was performed.  At all sites a 4 mm punch biopsy knife was used to obtain specimen at the central portion of the skin lesions in these locations.  All wounds were hemostatic and closed with figure-of-eight nylon suture.  Bacitracin and Band-Aids were applied.    Specimens:  Left cheek punch biopsy  Right forehead punch biopsy  Left chest punch biopsy  Right upper back punch biopsy    Estimated blood loss 5 mL    Complications: None  
done

## 2018-08-29 ENCOUNTER — OFFICE VISIT (OUTPATIENT)
Dept: SURGERY | Facility: CLINIC | Age: 73
End: 2018-08-29

## 2018-08-29 VITALS — HEIGHT: 76 IN | WEIGHT: 240 LBS | BODY MASS INDEX: 29.22 KG/M2

## 2018-08-29 DIAGNOSIS — C44.519 BASAL CELL CARCINOMA OF SKIN OF OTHER PART OF TRUNK: Primary | ICD-10-CM

## 2018-08-29 PROCEDURE — 99212 OFFICE O/P EST SF 10 MIN: CPT | Performed by: SURGERY

## 2018-08-29 NOTE — PROGRESS NOTES
Subjective   Chi Stanton is a 72 y.o. male  is here today for follow-up.         Chi Stanton is a 72 y.o. male here for follow up for surveillance for history of multiple basal cell carcinomas of the skin.  On examination the chest back and facial areas of previous excision showed no evidence of local recurrence or abnormality.  No lymphadenopathy or new lesions are identified on head to toe examination.      Chi was seen today for basal cell carcinoma follow up.    Diagnoses and all orders for this visit:    Basal cell carcinoma of skin of other part of trunk        Assessment     Chi Stanton is a 72 y.o. male with a history of basal cell carcinoma.  No evidence of recurrence or new lesions.  Follow-up in 3 months.

## 2018-09-19 RX ORDER — CHLORTHALIDONE 25 MG/1
25 TABLET ORAL DAILY
Qty: 90 TABLET | Refills: 1 | Status: ON HOLD | OUTPATIENT
Start: 2018-09-19 | End: 2019-04-27

## 2018-11-01 RX ORDER — RAMIPRIL 10 MG/1
10 CAPSULE ORAL 2 TIMES DAILY
Qty: 180 CAPSULE | Refills: 0 | Status: SHIPPED | OUTPATIENT
Start: 2018-11-01 | End: 2018-11-02 | Stop reason: SDUPTHER

## 2018-11-02 ENCOUNTER — OFFICE VISIT (OUTPATIENT)
Dept: CARDIOLOGY | Facility: CLINIC | Age: 73
End: 2018-11-02

## 2018-11-02 VITALS
WEIGHT: 246 LBS | HEIGHT: 76 IN | HEART RATE: 57 BPM | SYSTOLIC BLOOD PRESSURE: 148 MMHG | DIASTOLIC BLOOD PRESSURE: 88 MMHG | BODY MASS INDEX: 29.96 KG/M2

## 2018-11-02 DIAGNOSIS — I25.10 ASCVD (ARTERIOSCLEROTIC CARDIOVASCULAR DISEASE): Primary | ICD-10-CM

## 2018-11-02 DIAGNOSIS — E78.5 DYSLIPIDEMIA: ICD-10-CM

## 2018-11-02 DIAGNOSIS — I10 ESSENTIAL HYPERTENSION: ICD-10-CM

## 2018-11-02 PROBLEM — I25.83 CORONARY ATHEROSCLEROSIS DUE TO LIPID RICH PLAQUE: Status: RESOLVED | Noted: 2018-07-24 | Resolved: 2018-11-02

## 2018-11-02 PROCEDURE — 99213 OFFICE O/P EST LOW 20 MIN: CPT | Performed by: PHYSICIAN ASSISTANT

## 2018-11-02 RX ORDER — ATORVASTATIN CALCIUM 80 MG/1
80 TABLET, FILM COATED ORAL DAILY
Qty: 90 TABLET | Refills: 11 | Status: SHIPPED | OUTPATIENT
Start: 2018-11-02 | End: 2019-05-02 | Stop reason: HOSPADM

## 2018-11-02 RX ORDER — RAMIPRIL 10 MG/1
10 CAPSULE ORAL 2 TIMES DAILY
Qty: 180 CAPSULE | Refills: 3 | Status: SHIPPED | OUTPATIENT
Start: 2018-11-02 | End: 2018-11-05 | Stop reason: SDUPTHER

## 2018-11-02 NOTE — PATIENT INSTRUCTIONS
BMI for Adults  Body mass index (BMI) is a number that is calculated from a person's weight and height. In most adults, the number is used to find how much of an adult's weight is made up of fat. BMI is not as accurate as a direct measure of body fat.  HOW IS BMI CALCULATED?  BMI is calculated by dividing weight in kilograms by height in meters squared. It can also be calculated by dividing weight in pounds by height in inches squared, then multiplying the resulting number by 703. Charts are available to help you find your BMI quickly and easily without doing this calculation.   HOW IS BMI INTERPRETED?  Health care professionals use BMI charts to identify whether an adult is underweight, at a normal weight, or overweight based on the following guidelines:  · Underweight: BMI less than 18.5.  · Normal weight: BMI between 18.5 and 24.9.  · Overweight: BMI between 25 and 29.9.  · Obese: BMI of 30 and above.  BMI is usually interpreted the same for males and females.  Weight includes both fat and muscle, so someone with a muscular build, such as an athlete, may have a BMI that is higher than 24.9. In cases like these, BMI may not accurately depict body fat. To determine if excess body fat is the cause of a BMI of 25 or higher, further assessments may need to be done by a health care provider.  WHY IS BMI A USEFUL TOOL?  BMI is used to identify a possible weight problem that may be related to a medical problem or may increase the risk for medical problems. BMI can also be used to promote changes to reach a healthy weight.     This information is not intended to replace advice given to you by your health care provider. Make sure you discuss any questions you have with your health care provider.     Document Released: 08/29/2005 Document Revised: 01/08/2016 Document Reviewed: 05/15/2015  [employer] Interactive Patient Education ©2017 [employer]       Calorie Counting for Weight Loss  Calories are energy you get from the  things you eat and drink. Your body uses this energy to keep you going throughout the day. The number of calories you eat affects your weight. When you eat more calories than your body needs, your body stores the extra calories as fat. When you eat fewer calories than your body needs, your body burns fat to get the energy it needs.  Calorie counting means keeping track of how many calories you eat and drink each day. If you make sure to eat fewer calories than your body needs, you should lose weight. In order for calorie counting to work, you will need to eat the number of calories that are right for you in a day to lose a healthy amount of weight per week. A healthy amount of weight to lose per week is usually 1-2 lb (0.5-0.9 kg). A dietitian can determine how many calories you need in a day and give you suggestions on how to reach your calorie goal.   WHAT IS MY MY PLAN?  My goal is to have __________ calories per day.   If I have this many calories per day, I should lose around __________ pounds per week.  WHAT DO I NEED TO KNOW ABOUT CALORIE COUNTING?  In order to meet your daily calorie goal, you will need to:  · Find out how many calories are in each food you would like to eat. Try to do this before you eat.  · Decide how much of the food you can eat.  · Write down what you ate and how many calories it had. Doing this is called keeping a food log.  WHERE DO I FIND CALORIE INFORMATION?  The number of calories in a food can be found on a Nutrition Facts label. Note that all the information on a label is based on a specific serving of the food. If a food does not have a Nutrition Facts label, try to look up the calories online or ask your dietitian for help.  HOW DO I DECIDE HOW MUCH TO EAT?  To decide how much of the food you can eat, you will need to consider both the number of calories in one serving and the size of one serving. This information can be found on the Nutrition Facts label. If a food does not  have a Nutrition Facts label, look up the information online or ask your dietitian for help.  Remember that calories are listed per serving. If you choose to have more than one serving of a food, you will have to multiply the calories per serving by the amount of servings you plan to eat. For example, the label on a package of bread might say that a serving size is 1 slice and that there are 90 calories in a serving. If you eat 1 slice, you will have eaten 90 calories. If you eat 2 slices, you will have eaten 180 calories.  HOW DO I KEEP A FOOD LOG?  After each meal, record the following information in your food log:  · What you ate.  · How much of it you ate.  · How many calories it had.  · Then, add up your calories.  Keep your food log near you, such as in a small notebook in your pocket. Another option is to use a mobile mitzi or website. Some programs will calculate calories for you and show you how many calories you have left each time you add an item to the log.  WHAT ARE SOME CALORIE COUNTING TIPS?  · Use your calories on foods and drinks that will fill you up and not leave you hungry. Some examples of this include foods like nuts and nut butters, vegetables, lean proteins, and high-fiber foods (more than 5 g fiber per serving).  · Eat nutritious foods and avoid empty calories. Empty calories are calories you get from foods or beverages that do not have many nutrients, such as candy and soda. It is better to have a nutritious high-calorie food (such as an avocado) than a food with few nutrients (such as a bag of chips).  · Know how many calories are in the foods you eat most often. This way, you do not have to look up how many calories they have each time you eat them.  · Look out for foods that may seem like low-calorie foods but are really high-calorie foods, such as baked goods, soda, and fat-free candy.  · Pay attention to calories in drinks. Drinks such as sodas, specialty coffee drinks, alcohol, and  juices have a lot of calories yet do not fill you up. Choose low-calorie drinks like water and diet drinks.  · Focus your calorie counting efforts on higher calorie items. Logging the calories in a garden salad that contains only vegetables is less important than calculating the calories in a milk shake.  · Find a way of tracking calories that works for you. Get creative. Most people who are successful find ways to keep track of how much they eat in a day, even if they do not count every calorie.  WHAT ARE SOME PORTION CONTROL TIPS?  · Know how many calories are in a serving. This will help you know how many servings of a certain food you can have.  · Use a measuring cup to measure serving sizes. This is helpful when you start out. With time, you will be able to estimate serving sizes for some foods.  · Take some time to put servings of different foods on your favorite plates, bowls, and cups so you know what a serving looks like.  · Try not to eat straight from a bag or box. Doing this can lead to overeating. Put the amount you would like to eat in a cup or on a plate to make sure you are eating the right portion.  · Use smaller plates, glasses, and bowls to prevent overeating. This is a quick and easy way to practice portion control. If your plate is smaller, less food can fit on it.  · Try not to multitask while eating, such as watching TV or using your computer. If it is time to eat, sit down at a table and enjoy your food. Doing this will help you to start recognizing when you are full. It will also make you more aware of what and how much you are eating.  HOW CAN I CALORIE COUNT WHEN EATING OUT?  · Ask for smaller portion sizes or child-sized portions.  · Consider sharing an entree and sides instead of getting your own entree.  · If you get your own entree, eat only half. Ask for a box at the beginning of your meal and put the rest of your entree in it so you are not tempted to eat it.  · Look for the calories  "on the menu. If calories are listed, choose the lower calorie options.  · Choose dishes that include vegetables, fruits, whole grains, low-fat dairy products, and lean protein. Focusing on smart food choices from each of the 5 food groups can help you stay on track at restaurants.  · Choose items that are boiled, broiled, grilled, or steamed.  · Choose water, milk, unsweetened iced tea, or other drinks without added sugars. If you want an alcoholic beverage, choose a lower calorie option. For example, a regular jil can have up to 700 calories and a glass of wine has around 150.  · Stay away from items that are buttered, battered, fried, or served with cream sauce. Items labeled \"crispy\" are usually fried, unless stated otherwise.  · Ask for dressings, sauces, and syrups on the side. These are usually very high in calories, so do not eat much of them.  · Watch out for salads. Many people think salads are a healthy option, but this is often not the case. Many salads come with howell, fried chicken, lots of cheese, fried chips, and dressing. All of these items have a lot of calories. If you want a salad, choose a garden salad and ask for grilled meats or steak. Ask for the dressing on the side, or ask for olive oil and vinegar or lemon to use as dressing.  · Estimate how many servings of a food you are given. For example, a serving of cooked rice is ½ cup or about the size of half a tennis ball or one cupcake wrapper. Knowing serving sizes will help you be aware of how much food you are eating at restaurants. The list below tells you how big or small some common portion sizes are based on everyday objects.  ¨ 1 oz--4 stacked dice.  ¨ 3 oz--1 deck of cards.  ¨ 1 tsp--1 dice.  ¨ 1 Tbsp--½ a Ping-Pong ball.  ¨ 2 Tbsp--1 Ping-Pong ball.  ¨ ½ cup--1 tennis ball or 1 cupcake wrapper.  ¨ 1 cup--1 baseball.     This information is not intended to replace advice given to you by your health care provider. Make sure you " discuss any questions you have with your health care provider.     Document Released: 12/18/2006 Document Revised: 01/08/2016 Document Reviewed: 10/23/2014  [employer] Interactive Patient Education ©2017 [employer]

## 2018-11-02 NOTE — PROGRESS NOTES
Duran Rm, KARTIK  Chi Stanton  1945 11/02/2018    Patient Active Problem List   Diagnosis   • Hypertension   • Benign prostatic hyperplasia with urinary obstruction   • Basal cell carcinoma of skin   • Dyslipidemia   • ASCVD (arteriosclerotic cardiovascular disease)   • Cramp of both lower extremities   • Hypothyroidism   • Acute idiopathic gout involving toe of left foot   • Benign essential hypertension   • Abnormal blood chemistry level   • Body mass index 29.0-29.9, adult   • Cellulitis and abscess of face   • Cellulitis and abscess of digit   • Dysuria   • Elevated prostate specific antigen (PSA)   • Gastroesophageal reflux disease   • Esophageal reflux   • Gout   • Hearing loss       Dear Duran Rm, KARTIK:    Subjective       History of Present Illness:    Chief complaint: Follow up on ASCVD    Chi Stanton is a pleasant 72 y.o. male with a past medical history significant for ASCVD status post CABG 14 years ago, essential hypertension, dyslipidemia, and dyslipidemia.  Patient comes in today for routine cardiology follow-up.    Patient states that he's been doing well.  He denies any chest pain, shortness of breath worse from baseline, palpitations, weakness, fatigue, dizziness, or syncope.  He also denies any orthopnea, pedal edema, or PND.  He does state that he can do as much what he used to in terms of work load however this didn't slow in over many years.  He again denied shortness of breath laying flat and he states that he is able to walk up a flight of stairs without too much difficulty although he does get slight shortness of breath but after a couple seconds he recovers.      No Known Allergies:      Current Outpatient Prescriptions:   •  aspirin (ASPIRIN LOW DOSE) 81 MG tablet, Take 81 mg by mouth Daily., Disp: , Rfl:   •  atorvastatin (LIPITOR) 80 MG tablet, Take 1 tablet by mouth Daily., Disp: 90 tablet, Rfl: 11  •  chlorthalidone (HYGROTON) 25 MG tablet, Take 1  "tablet by mouth Daily., Disp: 90 tablet, Rfl: 1  •  famotidine (PEPCID) 20 MG tablet, Take 20 mg by mouth Daily., Disp: , Rfl:   •  levothyroxine (SYNTHROID, LEVOTHROID) 75 MCG tablet, Take 88 mcg by mouth Daily., Disp: , Rfl:   •  Omega-3 Fatty Acids (FISH OIL) 1000 MG capsule capsule, Take 1 capsule by mouth 3 (Three) Times a Day With Meals., Disp: 90 capsule, Rfl: 6  •  ramipril (ALTACE) 10 MG capsule, Take 1 capsule by mouth 2 (Two) Times a Day., Disp: 180 capsule, Rfl: 3      The following portions of the patient's history were reviewed and updated as appropriate: allergies, current medications, past family history, past medical history, past social history, past surgical history and problem list.    Social History   Substance Use Topics   • Smoking status: Former Smoker   • Smokeless tobacco: Never Used      Comment: QUIT FOR 20 YEARS   • Alcohol use No       Review of Systems   Constitution: Negative for weakness and malaise/fatigue.   Cardiovascular: Negative for chest pain, dyspnea on exertion, irregular heartbeat, leg swelling, palpitations and syncope.   Respiratory: Negative for cough and shortness of breath.    Hematologic/Lymphatic: Negative for bleeding problem. Does not bruise/bleed easily.   Gastrointestinal: Negative for nausea and vomiting.   Neurological: Negative for dizziness.       Objective   Vitals:    11/02/18 1053   BP: 148/88   BP Location: Right arm   Patient Position: Sitting   Cuff Size: Adult   Pulse: 57   Weight: 112 kg (246 lb)   Height: 193 cm (76\")     Body mass index is 29.94 kg/m².        Physical Exam   Constitutional: He is oriented to person, place, and time. He appears well-developed and well-nourished. No distress.   HENT:   Head: Normocephalic and atraumatic.   Cardiovascular: Normal rate, regular rhythm, normal heart sounds and intact distal pulses.    Pulmonary/Chest: Effort normal and breath sounds normal. No respiratory distress.   Musculoskeletal: He exhibits no " edema.   Neurological: He is alert and oriented to person, place, and time.   Skin: He is not diaphoretic.       Lab Results   Component Value Date     02/07/2017    K 4.3 02/07/2017     02/07/2017    CO2 29.7 02/07/2017    BUN 23 (H) 02/07/2017    CREATININE 1.33 (H) 02/07/2017    GLUCOSE 122 (H) 02/07/2017    CALCIUM 9.4 02/07/2017    AST 20 02/07/2017    ALT 24 02/07/2017    ALKPHOS 81 02/07/2017    LABIL2 1.4 (L) 11/24/2014     Lab Results   Component Value Date    CKTOTAL 82 11/22/2014     Lab Results   Component Value Date    WBC 9.5 11/24/2014    HGB 15.4 11/24/2014    HCT 46.7 11/24/2014     11/24/2014     Lab Results   Component Value Date    INR 0.97 11/21/2014     No results found for: MG  Lab Results   Component Value Date    PSA 1.4 05/07/2015    TRIG 217 (H) 03/27/2018    HDL 28 (L) 03/27/2018    LDL 39 03/27/2018      Lab Results   Component Value Date    BNP 54 11/21/2014       During this visit the following were done:  Labs Reviewed [x]    Labs Ordered []    Radiology Reports Reviewed [x]    Radiology Ordered []    PCP Records Reviewed []    Referring Provider Records Reviewed []    ER Records Reviewed []    Hospital Records Reviewed []    History Obtained From Family []    Radiology Images Reviewed []    Other Reviewed []    Records Requested []       Procedures      Assessment/Plan    Diagnosis Plan   1. ASCVD (arteriosclerotic cardiovascular disease)     2. Essential hypertension     3. Dyslipidemia                  Recommendations:  1. Attending aspirin, Lipitor, chlorthalidone, omega-3 fatty acids, and ramipril  2. Follow-up in 6 months.    Patient's Body mass index is 29.94 kg/m². BMI is above normal parameters. Recommendations include: educational material.       Return in about 6 months (around 5/2/2019).    As always, I appreciate very much the opportunity to participate in the cardiovascular care of your patients.      With Best Regards,    YOBANY Walsh  disclaimer:  Much of this encounter note is an electronic transcription/translation of spoken language to printed text. The electronic translation of spoken language may permit erroneous, or at times, nonsensical words or phrases to be inadvertently transcribed; Although I have reviewed the note for such errors, some may still exist.

## 2018-11-05 RX ORDER — RAMIPRIL 10 MG/1
10 CAPSULE ORAL 2 TIMES DAILY
Qty: 180 CAPSULE | Refills: 1 | Status: SHIPPED | OUTPATIENT
Start: 2018-11-05 | End: 2018-12-15 | Stop reason: SDUPTHER

## 2018-12-12 ENCOUNTER — OFFICE VISIT (OUTPATIENT)
Dept: SURGERY | Facility: CLINIC | Age: 73
End: 2018-12-12

## 2018-12-12 VITALS — BODY MASS INDEX: 29.22 KG/M2 | HEIGHT: 76 IN | WEIGHT: 240 LBS

## 2018-12-12 DIAGNOSIS — C44.519 BASAL CELL CARCINOMA (BCC) OF SKIN OF OTHER PART OF TORSO: Primary | ICD-10-CM

## 2018-12-12 PROCEDURE — 99212 OFFICE O/P EST SF 10 MIN: CPT | Performed by: SURGERY

## 2018-12-12 NOTE — PROGRESS NOTES
Subjective   Chi Stanton is a 73 y.o. male  is here today for follow-up.         Chi Stanton is a 73 y.o. male here for follow up for surveillance for history of multiple basal cell carcinomas of the skin.  On examination the chest back and facial areas of previous excision showed no evidence of local recurrence or abnormality.  No lymphadenopathy or new lesions are identified on head to toe examination.  The lesion on the left upper back remains stable and does not appear concerning at this time.      Chi was seen today for skin cancer follow up.    Diagnoses and all orders for this visit:    Basal cell carcinoma (BCC) of skin of other part of torso        Assessment     Chi Stanton is a 73 y.o. male with a history of basal cell carcinoma.  No evidence of recurrence or new lesions.  Follow-up in 3 months.

## 2018-12-17 RX ORDER — RAMIPRIL 10 MG/1
CAPSULE ORAL
Qty: 180 CAPSULE | Refills: 0 | Status: SHIPPED | OUTPATIENT
Start: 2018-12-17 | End: 2019-05-02 | Stop reason: HOSPADM

## 2019-04-26 ENCOUNTER — APPOINTMENT (OUTPATIENT)
Dept: CT IMAGING | Facility: HOSPITAL | Age: 74
End: 2019-04-26

## 2019-04-26 ENCOUNTER — HOSPITAL ENCOUNTER (EMERGENCY)
Facility: HOSPITAL | Age: 74
Discharge: HOME | End: 2019-04-27
Attending: FAMILY MEDICINE | Admitting: EMERGENCY MEDICINE

## 2019-04-26 DIAGNOSIS — K80.50 BILIARY COLIC: Primary | ICD-10-CM

## 2019-04-26 LAB
ALBUMIN SERPL-MCNC: 4.1 G/DL (ref 3.5–5.2)
ALBUMIN/GLOB SERPL: 1.3 G/DL
ALP SERPL-CCNC: 233 U/L (ref 39–117)
ALT SERPL W P-5'-P-CCNC: 201 U/L (ref 1–41)
ANION GAP SERPL CALCULATED.3IONS-SCNC: 18.2 MMOL/L
AST SERPL-CCNC: 90 U/L (ref 1–40)
BACTERIA UR QL AUTO: NORMAL /HPF
BASOPHILS # BLD AUTO: 0.02 10*3/MM3 (ref 0–0.2)
BASOPHILS NFR BLD AUTO: 0.3 % (ref 0–1.5)
BILIRUB SERPL-MCNC: 4.9 MG/DL (ref 0.2–1.2)
BILIRUB UR QL STRIP: ABNORMAL
BUN BLD-MCNC: 24 MG/DL (ref 8–23)
BUN/CREAT SERPL: 14.4 (ref 7–25)
CALCIUM SPEC-SCNC: 9.7 MG/DL (ref 8.6–10.5)
CHLORIDE SERPL-SCNC: 95 MMOL/L (ref 98–107)
CLARITY UR: ABNORMAL
CO2 SERPL-SCNC: 22.8 MMOL/L (ref 22–29)
COLOR UR: ABNORMAL
CREAT BLD-MCNC: 1.67 MG/DL (ref 0.76–1.27)
D-LACTATE SERPL-SCNC: 1.9 MMOL/L (ref 0.5–2)
DEPRECATED RDW RBC AUTO: 43.7 FL (ref 37–54)
EOSINOPHIL # BLD AUTO: 0.08 10*3/MM3 (ref 0–0.4)
EOSINOPHIL NFR BLD AUTO: 1.3 % (ref 0.3–6.2)
ERYTHROCYTE [DISTWIDTH] IN BLOOD BY AUTOMATED COUNT: 14.2 % (ref 12.3–15.4)
GFR SERPL CREATININE-BSD FRML MDRD: 40 ML/MIN/1.73
GLOBULIN UR ELPH-MCNC: 3.1 GM/DL
GLUCOSE BLD-MCNC: 169 MG/DL (ref 65–99)
GLUCOSE UR STRIP-MCNC: NEGATIVE MG/DL
HCT VFR BLD AUTO: 43.9 % (ref 37.5–51)
HGB BLD-MCNC: 14.9 G/DL (ref 13–17.7)
HGB UR QL STRIP.AUTO: NEGATIVE
HOLD SPECIMEN: NORMAL
HOLD SPECIMEN: NORMAL
HYALINE CASTS UR QL AUTO: NORMAL /LPF
IMM GRANULOCYTES # BLD AUTO: 0.02 10*3/MM3 (ref 0–0.05)
IMM GRANULOCYTES NFR BLD AUTO: 0.3 % (ref 0–0.5)
KETONES UR QL STRIP: ABNORMAL
LEUKOCYTE ESTERASE UR QL STRIP.AUTO: NEGATIVE
LIPASE SERPL-CCNC: 10 U/L (ref 13–60)
LYMPHOCYTES # BLD AUTO: 0.28 10*3/MM3 (ref 0.7–3.1)
LYMPHOCYTES NFR BLD AUTO: 4.4 % (ref 19.6–45.3)
MCH RBC QN AUTO: 28.8 PG (ref 26.6–33)
MCHC RBC AUTO-ENTMCNC: 33.9 G/DL (ref 31.5–35.7)
MCV RBC AUTO: 84.7 FL (ref 79–97)
MONOCYTES # BLD AUTO: 0.79 10*3/MM3 (ref 0.1–0.9)
MONOCYTES NFR BLD AUTO: 12.4 % (ref 5–12)
NEUTROPHILS # BLD AUTO: 5.17 10*3/MM3 (ref 1.7–7)
NEUTROPHILS NFR BLD AUTO: 81.3 % (ref 42.7–76)
NITRITE UR QL STRIP: NEGATIVE
PH UR STRIP.AUTO: >=9 [PH] (ref 5–8)
PLATELET # BLD AUTO: 166 10*3/MM3 (ref 140–450)
PMV BLD AUTO: 9.6 FL (ref 6–12)
POTASSIUM BLD-SCNC: 4 MMOL/L (ref 3.5–5.2)
PROT SERPL-MCNC: 7.2 G/DL (ref 6–8.5)
PROT UR QL STRIP: ABNORMAL
RBC # BLD AUTO: 5.18 10*6/MM3 (ref 4.14–5.8)
RBC # UR: NORMAL /HPF
REF LAB TEST METHOD: NORMAL
SODIUM BLD-SCNC: 136 MMOL/L (ref 136–145)
SP GR UR STRIP: 1.02 (ref 1–1.03)
SQUAMOUS #/AREA URNS HPF: NORMAL /HPF
TROPONIN T SERPL-MCNC: <0.01 NG/ML (ref 0–0.03)
UROBILINOGEN UR QL STRIP: ABNORMAL
WBC NRBC COR # BLD: 6.36 10*3/MM3 (ref 3.4–10.8)
WBC UR QL AUTO: NORMAL /HPF
WHOLE BLOOD HOLD SPECIMEN: NORMAL
WHOLE BLOOD HOLD SPECIMEN: NORMAL

## 2019-04-26 PROCEDURE — 36415 COLL VENOUS BLD VENIPUNCTURE: CPT

## 2019-04-26 PROCEDURE — 87040 BLOOD CULTURE FOR BACTERIA: CPT | Performed by: FAMILY MEDICINE

## 2019-04-26 PROCEDURE — 25010000002 MORPHINE PER 10 MG: Performed by: FAMILY MEDICINE

## 2019-04-26 PROCEDURE — 83605 ASSAY OF LACTIC ACID: CPT | Performed by: FAMILY MEDICINE

## 2019-04-26 PROCEDURE — 85025 COMPLETE CBC W/AUTO DIFF WBC: CPT | Performed by: EMERGENCY MEDICINE

## 2019-04-26 PROCEDURE — 93005 ELECTROCARDIOGRAM TRACING: CPT | Performed by: EMERGENCY MEDICINE

## 2019-04-26 PROCEDURE — 93010 ELECTROCARDIOGRAM REPORT: CPT | Performed by: INTERNAL MEDICINE

## 2019-04-26 PROCEDURE — 96375 TX/PRO/DX INJ NEW DRUG ADDON: CPT

## 2019-04-26 PROCEDURE — 87186 SC STD MICRODIL/AGAR DIL: CPT | Performed by: FAMILY MEDICINE

## 2019-04-26 PROCEDURE — 96376 TX/PRO/DX INJ SAME DRUG ADON: CPT

## 2019-04-26 PROCEDURE — 25010000002 ONDANSETRON PER 1 MG: Performed by: FAMILY MEDICINE

## 2019-04-26 PROCEDURE — 83690 ASSAY OF LIPASE: CPT | Performed by: FAMILY MEDICINE

## 2019-04-26 PROCEDURE — 80074 ACUTE HEPATITIS PANEL: CPT | Performed by: PHYSICIAN ASSISTANT

## 2019-04-26 PROCEDURE — 84484 ASSAY OF TROPONIN QUANT: CPT | Performed by: FAMILY MEDICINE

## 2019-04-26 PROCEDURE — 81001 URINALYSIS AUTO W/SCOPE: CPT | Performed by: FAMILY MEDICINE

## 2019-04-26 PROCEDURE — 87150 DNA/RNA AMPLIFIED PROBE: CPT | Performed by: FAMILY MEDICINE

## 2019-04-26 PROCEDURE — 99284 EMERGENCY DEPT VISIT MOD MDM: CPT

## 2019-04-26 PROCEDURE — 80053 COMPREHEN METABOLIC PANEL: CPT | Performed by: FAMILY MEDICINE

## 2019-04-26 PROCEDURE — 93005 ELECTROCARDIOGRAM TRACING: CPT | Performed by: FAMILY MEDICINE

## 2019-04-26 RX ORDER — MORPHINE SULFATE 2 MG/ML
2 INJECTION, SOLUTION INTRAMUSCULAR; INTRAVENOUS ONCE
Status: COMPLETED | OUTPATIENT
Start: 2019-04-26 | End: 2019-04-26

## 2019-04-26 RX ORDER — ONDANSETRON 2 MG/ML
4 INJECTION INTRAMUSCULAR; INTRAVENOUS ONCE
Status: COMPLETED | OUTPATIENT
Start: 2019-04-26 | End: 2019-04-26

## 2019-04-26 RX ORDER — SODIUM CHLORIDE 0.9 % (FLUSH) 0.9 %
10 SYRINGE (ML) INJECTION AS NEEDED
Status: DISCONTINUED | OUTPATIENT
Start: 2019-04-26 | End: 2019-04-27 | Stop reason: HOSPADM

## 2019-04-26 RX ADMIN — ONDANSETRON 4 MG: 2 INJECTION, SOLUTION INTRAMUSCULAR; INTRAVENOUS at 22:41

## 2019-04-26 RX ADMIN — SODIUM CHLORIDE 1000 ML: 9 INJECTION, SOLUTION INTRAVENOUS at 23:21

## 2019-04-26 RX ADMIN — MORPHINE SULFATE 2 MG: 2 INJECTION, SOLUTION INTRAMUSCULAR; INTRAVENOUS at 22:38

## 2019-04-26 RX ADMIN — MORPHINE SULFATE 2 MG: 2 INJECTION, SOLUTION INTRAMUSCULAR; INTRAVENOUS at 23:30

## 2019-04-26 RX ADMIN — ONDANSETRON 4 MG: 2 INJECTION, SOLUTION INTRAMUSCULAR; INTRAVENOUS at 23:19

## 2019-04-27 ENCOUNTER — HOSPITAL ENCOUNTER (INPATIENT)
Facility: HOSPITAL | Age: 74
LOS: 5 days | Discharge: HOME | End: 2019-05-02
Attending: EMERGENCY MEDICINE | Admitting: INTERNAL MEDICINE

## 2019-04-27 ENCOUNTER — APPOINTMENT (OUTPATIENT)
Dept: GENERAL RADIOLOGY | Facility: HOSPITAL | Age: 74
End: 2019-04-27

## 2019-04-27 ENCOUNTER — TELEPHONE (OUTPATIENT)
Dept: EMERGENCY DEPT | Facility: HOSPITAL | Age: 74
End: 2019-04-27

## 2019-04-27 ENCOUNTER — APPOINTMENT (OUTPATIENT)
Dept: CT IMAGING | Facility: HOSPITAL | Age: 74
End: 2019-04-27

## 2019-04-27 ENCOUNTER — APPOINTMENT (OUTPATIENT)
Dept: ULTRASOUND IMAGING | Facility: HOSPITAL | Age: 74
End: 2019-04-27

## 2019-04-27 VITALS
OXYGEN SATURATION: 96 % | SYSTOLIC BLOOD PRESSURE: 104 MMHG | HEIGHT: 76 IN | WEIGHT: 240 LBS | TEMPERATURE: 99 F | BODY MASS INDEX: 29.22 KG/M2 | HEART RATE: 88 BPM | DIASTOLIC BLOOD PRESSURE: 69 MMHG | RESPIRATION RATE: 16 BRPM

## 2019-04-27 DIAGNOSIS — K81.0 ACUTE CHOLECYSTITIS: ICD-10-CM

## 2019-04-27 DIAGNOSIS — E80.6 HYPERBILIRUBINEMIA: ICD-10-CM

## 2019-04-27 DIAGNOSIS — B96.20 E. COLI BACTEREMIA: Primary | ICD-10-CM

## 2019-04-27 DIAGNOSIS — R78.81 E. COLI BACTEREMIA: Primary | ICD-10-CM

## 2019-04-27 PROBLEM — L03.211 CELLULITIS AND ABSCESS OF FACE: Status: RESOLVED | Noted: 2018-07-24 | Resolved: 2019-04-27

## 2019-04-27 PROBLEM — L02.01 CELLULITIS AND ABSCESS OF FACE: Status: RESOLVED | Noted: 2018-07-24 | Resolved: 2019-04-27

## 2019-04-27 PROBLEM — IMO0002 CELLULITIS AND ABSCESS OF DIGIT: Status: RESOLVED | Noted: 2018-07-24 | Resolved: 2019-04-27

## 2019-04-27 LAB
ALBUMIN SERPL-MCNC: 3.52 G/DL (ref 3.5–5.2)
ALBUMIN/GLOB SERPL: 1.3 G/DL
ALP SERPL-CCNC: 185 U/L (ref 39–117)
ALT SERPL W P-5'-P-CCNC: 151 U/L (ref 1–41)
AMYLASE SERPL-CCNC: 23 U/L (ref 28–100)
ANION GAP SERPL CALCULATED.3IONS-SCNC: 12.9 MMOL/L
AST SERPL-CCNC: 72 U/L (ref 1–40)
BACTERIA BLD CULT: ABNORMAL
BACTERIA UR QL AUTO: ABNORMAL /HPF
BASOPHILS # BLD AUTO: 0.01 10*3/MM3 (ref 0–0.2)
BASOPHILS NFR BLD AUTO: 0.1 % (ref 0–1.5)
BILIRUB SERPL-MCNC: 6.6 MG/DL (ref 0.2–1.2)
BILIRUB UR QL STRIP: ABNORMAL
BUN BLD-MCNC: 29 MG/DL (ref 8–23)
BUN/CREAT SERPL: 16.4 (ref 7–25)
CALCIUM SPEC-SCNC: 8 MG/DL (ref 8.6–10.5)
CHLORIDE SERPL-SCNC: 97 MMOL/L (ref 98–107)
CLARITY UR: CLEAR
CO2 SERPL-SCNC: 27.1 MMOL/L (ref 22–29)
COLOR UR: ABNORMAL
CREAT BLD-MCNC: 1.77 MG/DL (ref 0.76–1.27)
CRP SERPL-MCNC: 14.73 MG/DL (ref 0–0.5)
D-LACTATE SERPL-SCNC: 1.4 MMOL/L (ref 0.5–2)
DEPRECATED RDW RBC AUTO: 46.9 FL (ref 37–54)
EOSINOPHIL # BLD AUTO: 0.02 10*3/MM3 (ref 0–0.4)
EOSINOPHIL NFR BLD AUTO: 0.2 % (ref 0.3–6.2)
ERYTHROCYTE [DISTWIDTH] IN BLOOD BY AUTOMATED COUNT: 14.7 % (ref 12.3–15.4)
GFR SERPL CREATININE-BSD FRML MDRD: 38 ML/MIN/1.73
GLOBULIN UR ELPH-MCNC: 2.8 GM/DL
GLUCOSE BLD-MCNC: 110 MG/DL (ref 65–99)
GLUCOSE BLDC GLUCOMTR-MCNC: 94 MG/DL (ref 70–130)
GLUCOSE UR STRIP-MCNC: NEGATIVE MG/DL
HAV IGM SERPL QL IA: NORMAL
HBA1C MFR BLD: 6.7 % (ref 4.8–5.6)
HBV CORE IGM SERPL QL IA: NORMAL
HBV SURFACE AG SERPL QL IA: NORMAL
HCT VFR BLD AUTO: 41.2 % (ref 37.5–51)
HCV AB SER DONR QL: NORMAL
HGB BLD-MCNC: 13.5 G/DL (ref 13–17.7)
HGB UR QL STRIP.AUTO: NEGATIVE
HYALINE CASTS UR QL AUTO: ABNORMAL /LPF
IMM GRANULOCYTES # BLD AUTO: 0.01 10*3/MM3 (ref 0–0.05)
IMM GRANULOCYTES NFR BLD AUTO: 0.1 % (ref 0–0.5)
INR PPP: 1.23 (ref 0.9–1.1)
KETONES UR QL STRIP: NEGATIVE
LEUKOCYTE ESTERASE UR QL STRIP.AUTO: ABNORMAL
LIPASE SERPL-CCNC: 6 U/L (ref 13–60)
LYMPHOCYTES # BLD AUTO: 0.35 10*3/MM3 (ref 0.7–3.1)
LYMPHOCYTES NFR BLD AUTO: 4 % (ref 19.6–45.3)
MCH RBC QN AUTO: 29 PG (ref 26.6–33)
MCHC RBC AUTO-ENTMCNC: 32.8 G/DL (ref 31.5–35.7)
MCV RBC AUTO: 88.4 FL (ref 79–97)
MONOCYTES # BLD AUTO: 1.16 10*3/MM3 (ref 0.1–0.9)
MONOCYTES NFR BLD AUTO: 13.3 % (ref 5–12)
NEUTROPHILS # BLD AUTO: 7.16 10*3/MM3 (ref 1.7–7)
NEUTROPHILS NFR BLD AUTO: 82.3 % (ref 42.7–76)
NITRITE UR QL STRIP: POSITIVE
PH UR STRIP.AUTO: 7 [PH] (ref 5–8)
PLATELET # BLD AUTO: 131 10*3/MM3 (ref 140–450)
PMV BLD AUTO: 10.1 FL (ref 6–12)
POTASSIUM BLD-SCNC: 3.9 MMOL/L (ref 3.5–5.2)
PROT SERPL-MCNC: 6.3 G/DL (ref 6–8.5)
PROT UR QL STRIP: ABNORMAL
PROTHROMBIN TIME: 15.7 SECONDS (ref 11–15.4)
RBC # BLD AUTO: 4.66 10*6/MM3 (ref 4.14–5.8)
RBC # UR: ABNORMAL /HPF
REF LAB TEST METHOD: ABNORMAL
SODIUM BLD-SCNC: 137 MMOL/L (ref 136–145)
SP GR UR STRIP: 1.03 (ref 1–1.03)
SQUAMOUS #/AREA URNS HPF: ABNORMAL /HPF
TRANS CELLS #/AREA URNS HPF: ABNORMAL /HPF
TROPONIN T SERPL-MCNC: <0.01 NG/ML (ref 0–0.03)
TSH SERPL DL<=0.05 MIU/L-ACNC: 5.75 MIU/ML (ref 0.27–4.2)
UROBILINOGEN UR QL STRIP: ABNORMAL
WBC NRBC COR # BLD: 8.71 10*3/MM3 (ref 3.4–10.8)
WBC UR QL AUTO: ABNORMAL /HPF

## 2019-04-27 PROCEDURE — 83605 ASSAY OF LACTIC ACID: CPT | Performed by: EMERGENCY MEDICINE

## 2019-04-27 PROCEDURE — 83690 ASSAY OF LIPASE: CPT | Performed by: EMERGENCY MEDICINE

## 2019-04-27 PROCEDURE — 25010000002 CEFTRIAXONE: Performed by: PHYSICIAN ASSISTANT

## 2019-04-27 PROCEDURE — 74176 CT ABD & PELVIS W/O CONTRAST: CPT | Performed by: RADIOLOGY

## 2019-04-27 PROCEDURE — 93010 ELECTROCARDIOGRAM REPORT: CPT | Performed by: INTERNAL MEDICINE

## 2019-04-27 PROCEDURE — 74176 CT ABD & PELVIS W/O CONTRAST: CPT

## 2019-04-27 PROCEDURE — 85025 COMPLETE CBC W/AUTO DIFF WBC: CPT | Performed by: EMERGENCY MEDICINE

## 2019-04-27 PROCEDURE — 87086 URINE CULTURE/COLONY COUNT: CPT | Performed by: EMERGENCY MEDICINE

## 2019-04-27 PROCEDURE — 82150 ASSAY OF AMYLASE: CPT | Performed by: PHYSICIAN ASSISTANT

## 2019-04-27 PROCEDURE — 99283 EMERGENCY DEPT VISIT LOW MDM: CPT

## 2019-04-27 PROCEDURE — 36415 COLL VENOUS BLD VENIPUNCTURE: CPT

## 2019-04-27 PROCEDURE — 81001 URINALYSIS AUTO W/SCOPE: CPT | Performed by: EMERGENCY MEDICINE

## 2019-04-27 PROCEDURE — 84443 ASSAY THYROID STIM HORMONE: CPT | Performed by: PHYSICIAN ASSISTANT

## 2019-04-27 PROCEDURE — 96374 THER/PROPH/DIAG INJ IV PUSH: CPT

## 2019-04-27 PROCEDURE — 25010000002 HEPARIN (PORCINE) PER 1000 UNITS: Performed by: PHYSICIAN ASSISTANT

## 2019-04-27 PROCEDURE — 83036 HEMOGLOBIN GLYCOSYLATED A1C: CPT | Performed by: PHYSICIAN ASSISTANT

## 2019-04-27 PROCEDURE — 71045 X-RAY EXAM CHEST 1 VIEW: CPT

## 2019-04-27 PROCEDURE — 99222 1ST HOSP IP/OBS MODERATE 55: CPT | Performed by: SURGERY

## 2019-04-27 PROCEDURE — 99223 1ST HOSP IP/OBS HIGH 75: CPT | Performed by: INTERNAL MEDICINE

## 2019-04-27 PROCEDURE — 80053 COMPREHEN METABOLIC PANEL: CPT | Performed by: EMERGENCY MEDICINE

## 2019-04-27 PROCEDURE — 86140 C-REACTIVE PROTEIN: CPT | Performed by: EMERGENCY MEDICINE

## 2019-04-27 PROCEDURE — 25010000002 PROMETHAZINE PER 50 MG: Performed by: FAMILY MEDICINE

## 2019-04-27 PROCEDURE — 84484 ASSAY OF TROPONIN QUANT: CPT | Performed by: PHYSICIAN ASSISTANT

## 2019-04-27 PROCEDURE — 76705 ECHO EXAM OF ABDOMEN: CPT | Performed by: RADIOLOGY

## 2019-04-27 PROCEDURE — 82962 GLUCOSE BLOOD TEST: CPT

## 2019-04-27 PROCEDURE — 87040 BLOOD CULTURE FOR BACTERIA: CPT | Performed by: EMERGENCY MEDICINE

## 2019-04-27 PROCEDURE — P9612 CATHETERIZE FOR URINE SPEC: HCPCS

## 2019-04-27 PROCEDURE — 93005 ELECTROCARDIOGRAM TRACING: CPT | Performed by: PHYSICIAN ASSISTANT

## 2019-04-27 PROCEDURE — 76705 ECHO EXAM OF ABDOMEN: CPT

## 2019-04-27 PROCEDURE — 25010000002 PIPERACILLIN-TAZOBACTAM: Performed by: EMERGENCY MEDICINE

## 2019-04-27 PROCEDURE — 85610 PROTHROMBIN TIME: CPT | Performed by: PHYSICIAN ASSISTANT

## 2019-04-27 PROCEDURE — 71045 X-RAY EXAM CHEST 1 VIEW: CPT | Performed by: RADIOLOGY

## 2019-04-27 RX ORDER — MORPHINE SULFATE 2 MG/ML
1 INJECTION, SOLUTION INTRAMUSCULAR; INTRAVENOUS EVERY 4 HOURS PRN
Status: DISCONTINUED | OUTPATIENT
Start: 2019-04-27 | End: 2019-05-02 | Stop reason: HOSPADM

## 2019-04-27 RX ORDER — LEVOTHYROXINE SODIUM 88 UG/1
88 TABLET ORAL DAILY
Status: ON HOLD | COMMUNITY
End: 2019-05-02 | Stop reason: SDUPTHER

## 2019-04-27 RX ORDER — ONDANSETRON 4 MG/1
4 TABLET, ORALLY DISINTEGRATING ORAL 4 TIMES DAILY
Qty: 15 TABLET | Refills: 0 | Status: ON HOLD | OUTPATIENT
Start: 2019-04-27 | End: 2019-04-27

## 2019-04-27 RX ORDER — FAMOTIDINE 20 MG/1
20 TABLET, FILM COATED ORAL EVERY 24 HOURS
Status: CANCELLED | OUTPATIENT
Start: 2019-04-27

## 2019-04-27 RX ORDER — NITROGLYCERIN 0.4 MG/1
0.4 TABLET SUBLINGUAL
Status: DISCONTINUED | OUTPATIENT
Start: 2019-04-27 | End: 2019-05-02 | Stop reason: HOSPADM

## 2019-04-27 RX ORDER — PROMETHAZINE HYDROCHLORIDE 25 MG/ML
12.5 INJECTION, SOLUTION INTRAMUSCULAR; INTRAVENOUS ONCE
Status: COMPLETED | OUTPATIENT
Start: 2019-04-27 | End: 2019-04-27

## 2019-04-27 RX ORDER — SODIUM CHLORIDE 0.9 % (FLUSH) 0.9 %
3 SYRINGE (ML) INJECTION EVERY 12 HOURS SCHEDULED
Status: DISCONTINUED | OUTPATIENT
Start: 2019-04-27 | End: 2019-05-02 | Stop reason: HOSPADM

## 2019-04-27 RX ORDER — SODIUM CHLORIDE 9 MG/ML
75 INJECTION, SOLUTION INTRAVENOUS CONTINUOUS
Status: DISCONTINUED | OUTPATIENT
Start: 2019-04-27 | End: 2019-04-30

## 2019-04-27 RX ORDER — PANTOPRAZOLE SODIUM 40 MG/10ML
40 INJECTION, POWDER, LYOPHILIZED, FOR SOLUTION INTRAVENOUS EVERY 12 HOURS SCHEDULED
Status: DISCONTINUED | OUTPATIENT
Start: 2019-04-27 | End: 2019-04-30

## 2019-04-27 RX ORDER — ASPIRIN 81 MG/1
81 TABLET ORAL DAILY
Status: CANCELLED | OUTPATIENT
Start: 2019-04-27

## 2019-04-27 RX ORDER — ATORVASTATIN CALCIUM 40 MG/1
80 TABLET, FILM COATED ORAL DAILY
Status: CANCELLED | OUTPATIENT
Start: 2019-04-27

## 2019-04-27 RX ORDER — PROMETHAZINE HYDROCHLORIDE 25 MG/1
12.5 SUPPOSITORY RECTAL EVERY 6 HOURS PRN
Qty: 12 SUPPOSITORY | Refills: 0 | Status: ON HOLD | OUTPATIENT
Start: 2019-04-27 | End: 2019-04-27

## 2019-04-27 RX ORDER — HEPARIN SODIUM 5000 [USP'U]/ML
5000 INJECTION, SOLUTION INTRAVENOUS; SUBCUTANEOUS EVERY 12 HOURS SCHEDULED
Status: DISCONTINUED | OUTPATIENT
Start: 2019-04-27 | End: 2019-05-02 | Stop reason: HOSPADM

## 2019-04-27 RX ORDER — SODIUM CHLORIDE 0.9 % (FLUSH) 0.9 %
10 SYRINGE (ML) INJECTION AS NEEDED
Status: DISCONTINUED | OUTPATIENT
Start: 2019-04-27 | End: 2019-05-02 | Stop reason: HOSPADM

## 2019-04-27 RX ORDER — SODIUM CHLORIDE 0.9 % (FLUSH) 0.9 %
3-10 SYRINGE (ML) INJECTION AS NEEDED
Status: DISCONTINUED | OUTPATIENT
Start: 2019-04-27 | End: 2019-05-02 | Stop reason: HOSPADM

## 2019-04-27 RX ORDER — RAMIPRIL 10 MG/1
10 CAPSULE ORAL 2 TIMES DAILY
Status: CANCELLED | OUTPATIENT
Start: 2019-04-27

## 2019-04-27 RX ORDER — LEVOTHYROXINE SODIUM 88 UG/1
88 TABLET ORAL DAILY
Status: CANCELLED | OUTPATIENT
Start: 2019-04-27

## 2019-04-27 RX ADMIN — SODIUM CHLORIDE, PRESERVATIVE FREE 3 ML: 5 INJECTION INTRAVENOUS at 19:43

## 2019-04-27 RX ADMIN — PROMETHAZINE HYDROCHLORIDE 12.5 MG: 25 INJECTION INTRAMUSCULAR; INTRAVENOUS at 00:09

## 2019-04-27 RX ADMIN — SODIUM CHLORIDE 1000 ML: 9 INJECTION, SOLUTION INTRAVENOUS at 15:36

## 2019-04-27 RX ADMIN — HEPARIN SODIUM 5000 UNITS: 5000 INJECTION INTRAVENOUS; SUBCUTANEOUS at 19:42

## 2019-04-27 RX ADMIN — CEFTRIAXONE 2 G: 2 INJECTION, POWDER, FOR SOLUTION INTRAMUSCULAR; INTRAVENOUS at 18:37

## 2019-04-27 RX ADMIN — SODIUM CHLORIDE 75 ML/HR: 9 INJECTION, SOLUTION INTRAVENOUS at 19:42

## 2019-04-27 RX ADMIN — PANTOPRAZOLE SODIUM 40 MG: 40 INJECTION, POWDER, FOR SOLUTION INTRAVENOUS at 19:42

## 2019-04-27 RX ADMIN — PIPERACILLIN SODIUM,TAZOBACTAM SODIUM 3.38 G: 3; .375 INJECTION, POWDER, FOR SOLUTION INTRAVENOUS at 15:36

## 2019-04-28 ENCOUNTER — APPOINTMENT (OUTPATIENT)
Dept: ULTRASOUND IMAGING | Facility: HOSPITAL | Age: 74
End: 2019-04-28

## 2019-04-28 ENCOUNTER — APPOINTMENT (OUTPATIENT)
Dept: CARDIOLOGY | Facility: HOSPITAL | Age: 74
End: 2019-04-28

## 2019-04-28 PROBLEM — K81.0 ACUTE CHOLECYSTITIS: Status: ACTIVE | Noted: 2019-04-28

## 2019-04-28 PROBLEM — E66.9 OBESITY (BMI 30-39.9): Chronic | Status: ACTIVE | Noted: 2019-04-28

## 2019-04-28 PROBLEM — N17.9 ACUTE RENAL FAILURE SUPERIMPOSED ON STAGE 3 CHRONIC KIDNEY DISEASE (HCC): Status: ACTIVE | Noted: 2019-04-28

## 2019-04-28 PROBLEM — R74.01 TRANSAMINITIS: Status: ACTIVE | Noted: 2019-04-28

## 2019-04-28 PROBLEM — M10.072 ACUTE IDIOPATHIC GOUT INVOLVING TOE OF LEFT FOOT: Status: RESOLVED | Noted: 2018-07-24 | Resolved: 2019-04-28

## 2019-04-28 PROBLEM — N18.30 ACUTE RENAL FAILURE SUPERIMPOSED ON STAGE 3 CHRONIC KIDNEY DISEASE (HCC): Status: ACTIVE | Noted: 2019-04-28

## 2019-04-28 PROBLEM — I71.43 ANEURYSM OF INFRARENAL ABDOMINAL AORTA (HCC): Status: ACTIVE | Noted: 2019-04-28

## 2019-04-28 PROBLEM — E03.9 HYPOTHYROIDISM: Chronic | Status: ACTIVE | Noted: 2018-07-24

## 2019-04-28 PROBLEM — K21.9 ESOPHAGEAL REFLUX: Chronic | Status: ACTIVE | Noted: 2018-07-24

## 2019-04-28 PROBLEM — Z87.891 FORMER SMOKER: Chronic | Status: ACTIVE | Noted: 2019-04-28

## 2019-04-28 PROBLEM — D69.6 THROMBOCYTOPENIA: Status: ACTIVE | Noted: 2019-04-28

## 2019-04-28 PROBLEM — E80.6 HYPERBILIRUBINEMIA: Status: ACTIVE | Noted: 2019-04-28

## 2019-04-28 LAB
ALBUMIN SERPL-MCNC: 2.83 G/DL (ref 3.5–5.2)
ALBUMIN/GLOB SERPL: 1 G/DL
ALP SERPL-CCNC: 156 U/L (ref 39–117)
ALT SERPL W P-5'-P-CCNC: 117 U/L (ref 1–41)
ANION GAP SERPL CALCULATED.3IONS-SCNC: 13.3 MMOL/L
AST SERPL-CCNC: 49 U/L (ref 1–40)
BACTERIA SPEC AEROBE CULT: NO GROWTH
BASOPHILS # BLD AUTO: 0.02 10*3/MM3 (ref 0–0.2)
BASOPHILS NFR BLD AUTO: 0.3 % (ref 0–1.5)
BH CV ECHO MEAS - % IVS THICK: 43.9 %
BH CV ECHO MEAS - % LVPW THICK: 67.7 %
BH CV ECHO MEAS - ACS: 1.9 CM
BH CV ECHO MEAS - AO MAX PG: 6.9 MMHG
BH CV ECHO MEAS - AO MEAN PG: 3.6 MMHG
BH CV ECHO MEAS - AO ROOT AREA (BSA CORRECTED): 1.3
BH CV ECHO MEAS - AO ROOT AREA: 7.3 CM^2
BH CV ECHO MEAS - AO ROOT DIAM: 3.1 CM
BH CV ECHO MEAS - AO V2 MAX: 131.7 CM/SEC
BH CV ECHO MEAS - AO V2 MEAN: 85.6 CM/SEC
BH CV ECHO MEAS - AO V2 VTI: 27.1 CM
BH CV ECHO MEAS - BSA(HAYCOCK): 2.4 M^2
BH CV ECHO MEAS - BSA: 2.3 M^2
BH CV ECHO MEAS - BZI_BMI: 31.1 KILOGRAMS/M^2
BH CV ECHO MEAS - BZI_METRIC_HEIGHT: 185.4 CM
BH CV ECHO MEAS - BZI_METRIC_WEIGHT: 107.1 KG
BH CV ECHO MEAS - EDV(CUBED): 167.7 ML
BH CV ECHO MEAS - EDV(MOD-SP4): 77 ML
BH CV ECHO MEAS - EDV(TEICH): 148.3 ML
BH CV ECHO MEAS - EF(CUBED): 72.6 %
BH CV ECHO MEAS - EF(MOD-SP4): 62.3 %
BH CV ECHO MEAS - EF(TEICH): 63.8 %
BH CV ECHO MEAS - ESV(CUBED): 45.9 ML
BH CV ECHO MEAS - ESV(MOD-SP4): 29 ML
BH CV ECHO MEAS - ESV(TEICH): 53.7 ML
BH CV ECHO MEAS - FS: 35.1 %
BH CV ECHO MEAS - IVS/LVPW: 0.74
BH CV ECHO MEAS - IVSD: 1.2 CM
BH CV ECHO MEAS - IVSS: 1.7 CM
BH CV ECHO MEAS - LA DIMENSION: 4.9 CM
BH CV ECHO MEAS - LA/AO: 1.6
BH CV ECHO MEAS - LV DIASTOLIC VOL/BSA (35-75): 33.4 ML/M^2
BH CV ECHO MEAS - LV MASS(C)D: 345.8 GRAMS
BH CV ECHO MEAS - LV MASS(C)DI: 149.8 GRAMS/M^2
BH CV ECHO MEAS - LV MASS(C)S: 396.8 GRAMS
BH CV ECHO MEAS - LV MASS(C)SI: 171.9 GRAMS/M^2
BH CV ECHO MEAS - LV SYSTOLIC VOL/BSA (12-30): 12.6 ML/M^2
BH CV ECHO MEAS - LVIDD: 5.5 CM
BH CV ECHO MEAS - LVIDS: 3.6 CM
BH CV ECHO MEAS - LVLD AP4: 8.5 CM
BH CV ECHO MEAS - LVLS AP4: 7.3 CM
BH CV ECHO MEAS - LVOT AREA (M): 2.8 CM^2
BH CV ECHO MEAS - LVOT AREA: 3 CM^2
BH CV ECHO MEAS - LVOT DIAM: 1.9 CM
BH CV ECHO MEAS - LVPWD: 1.6 CM
BH CV ECHO MEAS - LVPWS: 2.7 CM
BH CV ECHO MEAS - MV A MAX VEL: 42.9 CM/SEC
BH CV ECHO MEAS - MV E MAX VEL: 63.2 CM/SEC
BH CV ECHO MEAS - MV E/A: 1.5
BH CV ECHO MEAS - PA ACC SLOPE: 1579 CM/SEC^2
BH CV ECHO MEAS - PA ACC TIME: 0.07 SEC
BH CV ECHO MEAS - PA PR(ACCEL): 45.7 MMHG
BH CV ECHO MEAS - RAP SYSTOLE: 10 MMHG
BH CV ECHO MEAS - RVSP: 32.3 MMHG
BH CV ECHO MEAS - SI(AO): 86.1 ML/M^2
BH CV ECHO MEAS - SI(CUBED): 52.7 ML/M^2
BH CV ECHO MEAS - SI(MOD-SP4): 20.8 ML/M^2
BH CV ECHO MEAS - SI(TEICH): 41 ML/M^2
BH CV ECHO MEAS - SV(AO): 198.7 ML
BH CV ECHO MEAS - SV(CUBED): 121.8 ML
BH CV ECHO MEAS - SV(MOD-SP4): 48 ML
BH CV ECHO MEAS - SV(TEICH): 94.6 ML
BH CV ECHO MEAS - TR MAX VEL: 236.2 CM/SEC
BILIRUB SERPL-MCNC: 6.2 MG/DL (ref 0.2–1.2)
BUN BLD-MCNC: 31 MG/DL (ref 8–23)
BUN/CREAT SERPL: 15.2 (ref 7–25)
CALCIUM SPEC-SCNC: 7.5 MG/DL (ref 8.6–10.5)
CHLORIDE SERPL-SCNC: 96 MMOL/L (ref 98–107)
CO2 SERPL-SCNC: 23.7 MMOL/L (ref 22–29)
CREAT BLD-MCNC: 2.04 MG/DL (ref 0.76–1.27)
CRP SERPL-MCNC: 18.84 MG/DL (ref 0–0.5)
DEPRECATED RDW RBC AUTO: 46.7 FL (ref 37–54)
EOSINOPHIL # BLD AUTO: 0.08 10*3/MM3 (ref 0–0.4)
EOSINOPHIL NFR BLD AUTO: 1.1 % (ref 0.3–6.2)
ERYTHROCYTE [DISTWIDTH] IN BLOOD BY AUTOMATED COUNT: 14.6 % (ref 12.3–15.4)
GFR SERPL CREATININE-BSD FRML MDRD: 32 ML/MIN/1.73
GLOBULIN UR ELPH-MCNC: 2.8 GM/DL
GLUCOSE BLD-MCNC: 94 MG/DL (ref 65–99)
GLUCOSE BLDC GLUCOMTR-MCNC: 62 MG/DL (ref 70–130)
GLUCOSE BLDC GLUCOMTR-MCNC: 87 MG/DL (ref 70–130)
GLUCOSE BLDC GLUCOMTR-MCNC: 92 MG/DL (ref 70–130)
HCT VFR BLD AUTO: 37.2 % (ref 37.5–51)
HGB BLD-MCNC: 12 G/DL (ref 13–17.7)
IMM GRANULOCYTES # BLD AUTO: 0.01 10*3/MM3 (ref 0–0.05)
IMM GRANULOCYTES NFR BLD AUTO: 0.1 % (ref 0–0.5)
LYMPHOCYTES # BLD AUTO: 0.48 10*3/MM3 (ref 0.7–3.1)
LYMPHOCYTES NFR BLD AUTO: 6.5 % (ref 19.6–45.3)
MAGNESIUM SERPL-MCNC: 1.5 MG/DL (ref 1.6–2.4)
MCH RBC QN AUTO: 28.6 PG (ref 26.6–33)
MCHC RBC AUTO-ENTMCNC: 32.3 G/DL (ref 31.5–35.7)
MCV RBC AUTO: 88.8 FL (ref 79–97)
MONOCYTES # BLD AUTO: 1 10*3/MM3 (ref 0.1–0.9)
MONOCYTES NFR BLD AUTO: 13.6 % (ref 5–12)
NEUTROPHILS # BLD AUTO: 5.79 10*3/MM3 (ref 1.7–7)
NEUTROPHILS NFR BLD AUTO: 78.4 % (ref 42.7–76)
PHOSPHATE SERPL-MCNC: 2.4 MG/DL (ref 2.5–4.5)
PLATELET # BLD AUTO: 120 10*3/MM3 (ref 140–450)
PMV BLD AUTO: 10.9 FL (ref 6–12)
POTASSIUM BLD-SCNC: 3.8 MMOL/L (ref 3.5–5.2)
PROT SERPL-MCNC: 5.6 G/DL (ref 6–8.5)
PSA SERPL-MCNC: 2.8 NG/ML (ref 0–4)
RBC # BLD AUTO: 4.19 10*6/MM3 (ref 4.14–5.8)
SODIUM BLD-SCNC: 133 MMOL/L (ref 136–145)
T4 FREE SERPL-MCNC: 0.98 NG/DL (ref 0.93–1.7)
WBC NRBC COR # BLD: 7.38 10*3/MM3 (ref 3.4–10.8)

## 2019-04-28 PROCEDURE — 84100 ASSAY OF PHOSPHORUS: CPT | Performed by: PHYSICIAN ASSISTANT

## 2019-04-28 PROCEDURE — 94799 UNLISTED PULMONARY SVC/PX: CPT

## 2019-04-28 PROCEDURE — 82570 ASSAY OF URINE CREATININE: CPT | Performed by: PHYSICIAN ASSISTANT

## 2019-04-28 PROCEDURE — 82962 GLUCOSE BLOOD TEST: CPT

## 2019-04-28 PROCEDURE — 85025 COMPLETE CBC W/AUTO DIFF WBC: CPT | Performed by: PHYSICIAN ASSISTANT

## 2019-04-28 PROCEDURE — 99233 SBSQ HOSP IP/OBS HIGH 50: CPT | Performed by: PHYSICIAN ASSISTANT

## 2019-04-28 PROCEDURE — 80053 COMPREHEN METABOLIC PANEL: CPT | Performed by: PHYSICIAN ASSISTANT

## 2019-04-28 PROCEDURE — 93306 TTE W/DOPPLER COMPLETE: CPT

## 2019-04-28 PROCEDURE — 84153 ASSAY OF PSA TOTAL: CPT | Performed by: NURSE PRACTITIONER

## 2019-04-28 PROCEDURE — 84481 FREE ASSAY (FT-3): CPT | Performed by: PHYSICIAN ASSISTANT

## 2019-04-28 PROCEDURE — 84156 ASSAY OF PROTEIN URINE: CPT | Performed by: PHYSICIAN ASSISTANT

## 2019-04-28 PROCEDURE — 86140 C-REACTIVE PROTEIN: CPT | Performed by: PHYSICIAN ASSISTANT

## 2019-04-28 PROCEDURE — 76775 US EXAM ABDO BACK WALL LIM: CPT | Performed by: RADIOLOGY

## 2019-04-28 PROCEDURE — 84439 ASSAY OF FREE THYROXINE: CPT | Performed by: PHYSICIAN ASSISTANT

## 2019-04-28 PROCEDURE — 25010000002 HEPARIN (PORCINE) PER 1000 UNITS: Performed by: PHYSICIAN ASSISTANT

## 2019-04-28 PROCEDURE — 99222 1ST HOSP IP/OBS MODERATE 55: CPT | Performed by: INTERNAL MEDICINE

## 2019-04-28 PROCEDURE — 25010000002 CEFTRIAXONE: Performed by: PHYSICIAN ASSISTANT

## 2019-04-28 PROCEDURE — 76775 US EXAM ABDO BACK WALL LIM: CPT

## 2019-04-28 PROCEDURE — 83735 ASSAY OF MAGNESIUM: CPT | Performed by: PHYSICIAN ASSISTANT

## 2019-04-28 PROCEDURE — 93306 TTE W/DOPPLER COMPLETE: CPT | Performed by: INTERNAL MEDICINE

## 2019-04-28 PROCEDURE — 99024 POSTOP FOLLOW-UP VISIT: CPT | Performed by: SURGERY

## 2019-04-28 RX ORDER — MAGNESIUM SULFATE 1 G/100ML
1 INJECTION INTRAVENOUS AS NEEDED
Status: DISCONTINUED | OUTPATIENT
Start: 2019-04-28 | End: 2019-05-02 | Stop reason: HOSPADM

## 2019-04-28 RX ORDER — MULTIVITAMIN
1 TABLET ORAL DAILY
Status: DISCONTINUED | OUTPATIENT
Start: 2019-04-28 | End: 2019-05-02 | Stop reason: HOSPADM

## 2019-04-28 RX ORDER — MAGNESIUM SULFATE HEPTAHYDRATE 40 MG/ML
4 INJECTION, SOLUTION INTRAVENOUS AS NEEDED
Status: DISCONTINUED | OUTPATIENT
Start: 2019-04-28 | End: 2019-05-02 | Stop reason: HOSPADM

## 2019-04-28 RX ORDER — ATORVASTATIN CALCIUM 40 MG/1
80 TABLET, FILM COATED ORAL DAILY
Status: DISCONTINUED | OUTPATIENT
Start: 2019-04-28 | End: 2019-05-01

## 2019-04-28 RX ORDER — MAGNESIUM SULFATE HEPTAHYDRATE 40 MG/ML
2 INJECTION, SOLUTION INTRAVENOUS AS NEEDED
Status: DISCONTINUED | OUTPATIENT
Start: 2019-04-28 | End: 2019-05-02 | Stop reason: HOSPADM

## 2019-04-28 RX ORDER — LEVOTHYROXINE SODIUM 88 UG/1
88 TABLET ORAL DAILY
Status: DISCONTINUED | OUTPATIENT
Start: 2019-04-28 | End: 2019-05-02 | Stop reason: HOSPADM

## 2019-04-28 RX ORDER — ACETAMINOPHEN 325 MG/1
650 TABLET ORAL EVERY 6 HOURS PRN
Status: DISCONTINUED | OUTPATIENT
Start: 2019-04-28 | End: 2019-05-02 | Stop reason: HOSPADM

## 2019-04-28 RX ADMIN — HEPARIN SODIUM 5000 UNITS: 5000 INJECTION INTRAVENOUS; SUBCUTANEOUS at 19:30

## 2019-04-28 RX ADMIN — Medication 1 TABLET: at 11:33

## 2019-04-28 RX ADMIN — PANTOPRAZOLE SODIUM 40 MG: 40 INJECTION, POWDER, FOR SOLUTION INTRAVENOUS at 08:38

## 2019-04-28 RX ADMIN — HEPARIN SODIUM 5000 UNITS: 5000 INJECTION INTRAVENOUS; SUBCUTANEOUS at 08:38

## 2019-04-28 RX ADMIN — ATORVASTATIN CALCIUM 80 MG: 40 TABLET, FILM COATED ORAL at 11:33

## 2019-04-28 RX ADMIN — CEFTRIAXONE 2 G: 2 INJECTION, POWDER, FOR SOLUTION INTRAMUSCULAR; INTRAVENOUS at 17:56

## 2019-04-28 RX ADMIN — ACETAMINOPHEN 650 MG: 325 TABLET, FILM COATED ORAL at 08:38

## 2019-04-28 RX ADMIN — SODIUM CHLORIDE 75 ML/HR: 9 INJECTION, SOLUTION INTRAVENOUS at 19:30

## 2019-04-28 RX ADMIN — METRONIDAZOLE 500 MG: 500 INJECTION, SOLUTION INTRAVENOUS at 13:00

## 2019-04-28 RX ADMIN — SODIUM CHLORIDE 75 ML/HR: 9 INJECTION, SOLUTION INTRAVENOUS at 08:37

## 2019-04-28 RX ADMIN — METRONIDAZOLE 500 MG: 500 INJECTION, SOLUTION INTRAVENOUS at 19:30

## 2019-04-28 RX ADMIN — SODIUM CHLORIDE, PRESERVATIVE FREE 3 ML: 5 INJECTION INTRAVENOUS at 08:39

## 2019-04-28 RX ADMIN — PANTOPRAZOLE SODIUM 40 MG: 40 INJECTION, POWDER, FOR SOLUTION INTRAVENOUS at 19:30

## 2019-04-28 RX ADMIN — LEVOTHYROXINE SODIUM 88 MCG: 88 TABLET ORAL at 11:33

## 2019-04-29 ENCOUNTER — ANESTHESIA EVENT (OUTPATIENT)
Dept: PERIOP | Facility: HOSPITAL | Age: 74
End: 2019-04-29

## 2019-04-29 ENCOUNTER — APPOINTMENT (OUTPATIENT)
Dept: GENERAL RADIOLOGY | Facility: HOSPITAL | Age: 74
End: 2019-04-29

## 2019-04-29 ENCOUNTER — ANESTHESIA (OUTPATIENT)
Dept: PERIOP | Facility: HOSPITAL | Age: 74
End: 2019-04-29

## 2019-04-29 LAB
ABO GROUP BLD: NORMAL
ABO GROUP BLD: NORMAL
ALBUMIN SERPL-MCNC: 2.84 G/DL (ref 3.5–5.2)
ALBUMIN/GLOB SERPL: 1.1 G/DL
ALP SERPL-CCNC: 159 U/L (ref 39–117)
ALT SERPL W P-5'-P-CCNC: 83 U/L (ref 1–41)
ANION GAP SERPL CALCULATED.3IONS-SCNC: 14.2 MMOL/L
AST SERPL-CCNC: 33 U/L (ref 1–40)
BACTERIA SPEC AEROBE CULT: ABNORMAL
BACTERIA SPEC AEROBE CULT: ABNORMAL
BASOPHILS # BLD AUTO: 0.01 10*3/MM3 (ref 0–0.2)
BASOPHILS NFR BLD AUTO: 0.2 % (ref 0–1.5)
BILIRUB SERPL-MCNC: 4.3 MG/DL (ref 0.2–1.2)
BLD GP AB SCN SERPL QL: NEGATIVE
BUN BLD-MCNC: 23 MG/DL (ref 8–23)
BUN/CREAT SERPL: 15.3 (ref 7–25)
CALCIUM SPEC-SCNC: 8.1 MG/DL (ref 8.6–10.5)
CHLORIDE SERPL-SCNC: 98 MMOL/L (ref 98–107)
CO2 SERPL-SCNC: 21.8 MMOL/L (ref 22–29)
CREAT BLD-MCNC: 1.5 MG/DL (ref 0.76–1.27)
CREAT UR-MCNC: 54.8 MG/DL
CRP SERPL-MCNC: 17.18 MG/DL (ref 0–0.5)
DEPRECATED RDW RBC AUTO: 46.9 FL (ref 37–54)
EOSINOPHIL # BLD AUTO: 0.07 10*3/MM3 (ref 0–0.4)
EOSINOPHIL NFR BLD AUTO: 1.4 % (ref 0.3–6.2)
ERYTHROCYTE [DISTWIDTH] IN BLOOD BY AUTOMATED COUNT: 14.7 % (ref 12.3–15.4)
GFR SERPL CREATININE-BSD FRML MDRD: 46 ML/MIN/1.73
GLOBULIN UR ELPH-MCNC: 2.7 GM/DL
GLUCOSE BLD-MCNC: 103 MG/DL (ref 65–99)
GLUCOSE BLDC GLUCOMTR-MCNC: 109 MG/DL (ref 70–130)
GLUCOSE BLDC GLUCOMTR-MCNC: 87 MG/DL (ref 70–130)
GLUCOSE BLDC GLUCOMTR-MCNC: 88 MG/DL (ref 70–130)
GLUCOSE BLDC GLUCOMTR-MCNC: 92 MG/DL (ref 70–130)
GLUCOSE BLDC GLUCOMTR-MCNC: 96 MG/DL (ref 70–130)
GRAM STN SPEC: ABNORMAL
HCT VFR BLD AUTO: 35.8 % (ref 37.5–51)
HGB BLD-MCNC: 11.6 G/DL (ref 13–17.7)
IMM GRANULOCYTES # BLD AUTO: 0 10*3/MM3 (ref 0–0.05)
IMM GRANULOCYTES NFR BLD AUTO: 0 % (ref 0–0.5)
ISOLATED FROM: ABNORMAL
ISOLATED FROM: ABNORMAL
LYMPHOCYTES # BLD AUTO: 0.46 10*3/MM3 (ref 0.7–3.1)
LYMPHOCYTES NFR BLD AUTO: 9.3 % (ref 19.6–45.3)
MAGNESIUM SERPL-MCNC: 1.8 MG/DL (ref 1.6–2.4)
MCH RBC QN AUTO: 28.5 PG (ref 26.6–33)
MCHC RBC AUTO-ENTMCNC: 32.4 G/DL (ref 31.5–35.7)
MCV RBC AUTO: 88 FL (ref 79–97)
MONOCYTES # BLD AUTO: 0.46 10*3/MM3 (ref 0.1–0.9)
MONOCYTES NFR BLD AUTO: 9.3 % (ref 5–12)
NEUTROPHILS # BLD AUTO: 3.94 10*3/MM3 (ref 1.7–7)
NEUTROPHILS NFR BLD AUTO: 79.8 % (ref 42.7–76)
PHOSPHATE SERPL-MCNC: 2.4 MG/DL (ref 2.5–4.5)
PLATELET # BLD AUTO: 129 10*3/MM3 (ref 140–450)
PMV BLD AUTO: 10 FL (ref 6–12)
POTASSIUM BLD-SCNC: 3.5 MMOL/L (ref 3.5–5.2)
PROT SERPL-MCNC: 5.5 G/DL (ref 6–8.5)
PROT UR-MCNC: 12 MG/DL
PROT/CREAT UR: 219 MG/G CREA (ref 0–200)
RBC # BLD AUTO: 4.07 10*6/MM3 (ref 4.14–5.8)
RH BLD: NEGATIVE
RH BLD: NEGATIVE
SODIUM BLD-SCNC: 134 MMOL/L (ref 136–145)
T&S EXPIRATION DATE: NORMAL
T3FREE SERPL-MCNC: 1.65 PG/ML (ref 2–4.4)
WBC NRBC COR # BLD: 4.94 10*3/MM3 (ref 3.4–10.8)

## 2019-04-29 PROCEDURE — 25010000002 CEFTRIAXONE: Performed by: PHYSICIAN ASSISTANT

## 2019-04-29 PROCEDURE — 94799 UNLISTED PULMONARY SVC/PX: CPT

## 2019-04-29 PROCEDURE — 25010000002 PROPOFOL 10 MG/ML EMULSION: Performed by: NURSE ANESTHETIST, CERTIFIED REGISTERED

## 2019-04-29 PROCEDURE — 99232 SBSQ HOSP IP/OBS MODERATE 35: CPT | Performed by: INTERNAL MEDICINE

## 2019-04-29 PROCEDURE — 82962 GLUCOSE BLOOD TEST: CPT

## 2019-04-29 PROCEDURE — 0FC98ZZ EXTIRPATION OF MATTER FROM COMMON BILE DUCT, VIA NATURAL OR ARTIFICIAL OPENING ENDOSCOPIC: ICD-10-PCS | Performed by: INTERNAL MEDICINE

## 2019-04-29 PROCEDURE — 25010000002 ONDANSETRON PER 1 MG: Performed by: NURSE ANESTHETIST, CERTIFIED REGISTERED

## 2019-04-29 PROCEDURE — 74328 X-RAY BILE DUCT ENDOSCOPY: CPT | Performed by: RADIOLOGY

## 2019-04-29 PROCEDURE — C1769 GUIDE WIRE: HCPCS | Performed by: SURGERY

## 2019-04-29 PROCEDURE — 25010000002 FENTANYL CITRATE (PF) 100 MCG/2ML SOLUTION: Performed by: NURSE ANESTHETIST, CERTIFIED REGISTERED

## 2019-04-29 PROCEDURE — 80053 COMPREHEN METABOLIC PANEL: CPT | Performed by: PHYSICIAN ASSISTANT

## 2019-04-29 PROCEDURE — 76000 FLUOROSCOPY <1 HR PHYS/QHP: CPT

## 2019-04-29 PROCEDURE — 88304 TISSUE EXAM BY PATHOLOGIST: CPT | Performed by: SURGERY

## 2019-04-29 PROCEDURE — 99221 1ST HOSP IP/OBS SF/LOW 40: CPT | Performed by: SURGERY

## 2019-04-29 PROCEDURE — 84100 ASSAY OF PHOSPHORUS: CPT | Performed by: PHYSICIAN ASSISTANT

## 2019-04-29 PROCEDURE — 86900 BLOOD TYPING SEROLOGIC ABO: CPT

## 2019-04-29 PROCEDURE — 25010000002 HEPARIN (PORCINE) PER 1000 UNITS: Performed by: PHYSICIAN ASSISTANT

## 2019-04-29 PROCEDURE — 85025 COMPLETE CBC W/AUTO DIFF WBC: CPT | Performed by: PHYSICIAN ASSISTANT

## 2019-04-29 PROCEDURE — 86140 C-REACTIVE PROTEIN: CPT | Performed by: PHYSICIAN ASSISTANT

## 2019-04-29 PROCEDURE — 25010000002 DEXAMETHASONE PER 1 MG: Performed by: NURSE ANESTHETIST, CERTIFIED REGISTERED

## 2019-04-29 PROCEDURE — 47562 LAPAROSCOPIC CHOLECYSTECTOMY: CPT | Performed by: SURGERY

## 2019-04-29 PROCEDURE — 25010000002 MORPHINE PER 10 MG: Performed by: INTERNAL MEDICINE

## 2019-04-29 PROCEDURE — 25010000002 MAGNESIUM SULFATE 2 GM/50ML SOLUTION: Performed by: PHYSICIAN ASSISTANT

## 2019-04-29 PROCEDURE — 25010000002 NEOSTIGMINE 10 MG/10ML SOLUTION: Performed by: NURSE ANESTHETIST, CERTIFIED REGISTERED

## 2019-04-29 PROCEDURE — 0FT44ZZ RESECTION OF GALLBLADDER, PERCUTANEOUS ENDOSCOPIC APPROACH: ICD-10-PCS | Performed by: SURGERY

## 2019-04-29 PROCEDURE — 83735 ASSAY OF MAGNESIUM: CPT | Performed by: PHYSICIAN ASSISTANT

## 2019-04-29 PROCEDURE — 86901 BLOOD TYPING SEROLOGIC RH(D): CPT | Performed by: SURGERY

## 2019-04-29 PROCEDURE — 86900 BLOOD TYPING SEROLOGIC ABO: CPT | Performed by: SURGERY

## 2019-04-29 PROCEDURE — 86901 BLOOD TYPING SEROLOGIC RH(D): CPT

## 2019-04-29 PROCEDURE — 86850 RBC ANTIBODY SCREEN: CPT | Performed by: SURGERY

## 2019-04-29 RX ORDER — NEOSTIGMINE METHYLSULFATE 1 MG/ML
INJECTION, SOLUTION INTRAVENOUS AS NEEDED
Status: DISCONTINUED | OUTPATIENT
Start: 2019-04-29 | End: 2019-04-29 | Stop reason: SURG

## 2019-04-29 RX ORDER — MAGNESIUM SULFATE HEPTAHYDRATE 40 MG/ML
2 INJECTION, SOLUTION INTRAVENOUS ONCE
Status: COMPLETED | OUTPATIENT
Start: 2019-04-29 | End: 2019-04-29

## 2019-04-29 RX ORDER — SODIUM CHLORIDE, SODIUM LACTATE, POTASSIUM CHLORIDE, CALCIUM CHLORIDE 600; 310; 30; 20 MG/100ML; MG/100ML; MG/100ML; MG/100ML
125 INJECTION, SOLUTION INTRAVENOUS CONTINUOUS
Status: DISCONTINUED | OUTPATIENT
Start: 2019-04-29 | End: 2019-04-30

## 2019-04-29 RX ORDER — BUPIVACAINE HYDROCHLORIDE AND EPINEPHRINE 2.5; 5 MG/ML; UG/ML
INJECTION, SOLUTION EPIDURAL; INFILTRATION; INTRACAUDAL; PERINEURAL AS NEEDED
Status: DISCONTINUED | OUTPATIENT
Start: 2019-04-29 | End: 2019-04-29 | Stop reason: HOSPADM

## 2019-04-29 RX ORDER — DEXAMETHASONE SODIUM PHOSPHATE 4 MG/ML
INJECTION, SOLUTION INTRA-ARTICULAR; INTRALESIONAL; INTRAMUSCULAR; INTRAVENOUS; SOFT TISSUE AS NEEDED
Status: DISCONTINUED | OUTPATIENT
Start: 2019-04-29 | End: 2019-04-29 | Stop reason: SURG

## 2019-04-29 RX ORDER — ROCURONIUM BROMIDE 10 MG/ML
INJECTION, SOLUTION INTRAVENOUS AS NEEDED
Status: DISCONTINUED | OUTPATIENT
Start: 2019-04-29 | End: 2019-04-29 | Stop reason: SURG

## 2019-04-29 RX ORDER — GLYCOPYRROLATE 0.2 MG/ML
INJECTION INTRAMUSCULAR; INTRAVENOUS AS NEEDED
Status: DISCONTINUED | OUTPATIENT
Start: 2019-04-29 | End: 2019-04-29 | Stop reason: SURG

## 2019-04-29 RX ORDER — IPRATROPIUM BROMIDE AND ALBUTEROL SULFATE 2.5; .5 MG/3ML; MG/3ML
3 SOLUTION RESPIRATORY (INHALATION) ONCE AS NEEDED
Status: DISCONTINUED | OUTPATIENT
Start: 2019-04-29 | End: 2019-04-29 | Stop reason: HOSPADM

## 2019-04-29 RX ORDER — MEPERIDINE HYDROCHLORIDE 25 MG/ML
12.5 INJECTION INTRAMUSCULAR; INTRAVENOUS; SUBCUTANEOUS
Status: DISCONTINUED | OUTPATIENT
Start: 2019-04-29 | End: 2019-04-29 | Stop reason: HOSPADM

## 2019-04-29 RX ORDER — MAGNESIUM HYDROXIDE 1200 MG/15ML
LIQUID ORAL AS NEEDED
Status: DISCONTINUED | OUTPATIENT
Start: 2019-04-29 | End: 2019-04-29 | Stop reason: HOSPADM

## 2019-04-29 RX ORDER — SODIUM CHLORIDE 0.9 % (FLUSH) 0.9 %
3-10 SYRINGE (ML) INJECTION AS NEEDED
Status: DISCONTINUED | OUTPATIENT
Start: 2019-04-29 | End: 2019-04-29 | Stop reason: HOSPADM

## 2019-04-29 RX ORDER — ONDANSETRON 2 MG/ML
4 INJECTION INTRAMUSCULAR; INTRAVENOUS ONCE AS NEEDED
Status: DISCONTINUED | OUTPATIENT
Start: 2019-04-29 | End: 2019-04-29 | Stop reason: HOSPADM

## 2019-04-29 RX ORDER — LIDOCAINE HYDROCHLORIDE 20 MG/ML
INJECTION, SOLUTION EPIDURAL; INFILTRATION; INTRACAUDAL; PERINEURAL AS NEEDED
Status: DISCONTINUED | OUTPATIENT
Start: 2019-04-29 | End: 2019-04-29 | Stop reason: SURG

## 2019-04-29 RX ORDER — SODIUM CHLORIDE 0.9 % (FLUSH) 0.9 %
3 SYRINGE (ML) INJECTION EVERY 12 HOURS SCHEDULED
Status: DISCONTINUED | OUTPATIENT
Start: 2019-04-29 | End: 2019-04-29 | Stop reason: HOSPADM

## 2019-04-29 RX ORDER — ONDANSETRON 2 MG/ML
INJECTION INTRAMUSCULAR; INTRAVENOUS AS NEEDED
Status: DISCONTINUED | OUTPATIENT
Start: 2019-04-29 | End: 2019-04-29 | Stop reason: SURG

## 2019-04-29 RX ORDER — FENTANYL CITRATE 50 UG/ML
INJECTION, SOLUTION INTRAMUSCULAR; INTRAVENOUS AS NEEDED
Status: DISCONTINUED | OUTPATIENT
Start: 2019-04-29 | End: 2019-04-29 | Stop reason: SURG

## 2019-04-29 RX ORDER — OXYCODONE HYDROCHLORIDE AND ACETAMINOPHEN 5; 325 MG/1; MG/1
1 TABLET ORAL ONCE AS NEEDED
Status: DISCONTINUED | OUTPATIENT
Start: 2019-04-29 | End: 2019-04-29 | Stop reason: HOSPADM

## 2019-04-29 RX ORDER — FENTANYL CITRATE 50 UG/ML
50 INJECTION, SOLUTION INTRAMUSCULAR; INTRAVENOUS
Status: DISCONTINUED | OUTPATIENT
Start: 2019-04-29 | End: 2019-04-29 | Stop reason: HOSPADM

## 2019-04-29 RX ORDER — SODIUM CHLORIDE 9 MG/ML
INJECTION, SOLUTION INTRAVENOUS AS NEEDED
Status: DISCONTINUED | OUTPATIENT
Start: 2019-04-29 | End: 2019-04-29 | Stop reason: HOSPADM

## 2019-04-29 RX ORDER — PROPOFOL 10 MG/ML
VIAL (ML) INTRAVENOUS AS NEEDED
Status: DISCONTINUED | OUTPATIENT
Start: 2019-04-29 | End: 2019-04-29 | Stop reason: SURG

## 2019-04-29 RX ADMIN — MAGNESIUM SULFATE IN WATER 2 G: 40 INJECTION, SOLUTION INTRAVENOUS at 05:02

## 2019-04-29 RX ADMIN — METRONIDAZOLE 500 MG: 500 INJECTION, SOLUTION INTRAVENOUS at 11:49

## 2019-04-29 RX ADMIN — PANTOPRAZOLE SODIUM 40 MG: 40 INJECTION, POWDER, FOR SOLUTION INTRAVENOUS at 20:00

## 2019-04-29 RX ADMIN — GLYCOPYRROLATE 0.4 MG: 0.2 INJECTION, SOLUTION INTRAMUSCULAR; INTRAVENOUS at 15:51

## 2019-04-29 RX ADMIN — METRONIDAZOLE 500 MG: 500 INJECTION, SOLUTION INTRAVENOUS at 04:01

## 2019-04-29 RX ADMIN — HEPARIN SODIUM 5000 UNITS: 5000 INJECTION INTRAVENOUS; SUBCUTANEOUS at 20:00

## 2019-04-29 RX ADMIN — ROCURONIUM BROMIDE 20 MG: 10 INJECTION INTRAVENOUS at 15:24

## 2019-04-29 RX ADMIN — Medication 1 TABLET: at 08:48

## 2019-04-29 RX ADMIN — ONDANSETRON 4 MG: 2 INJECTION, SOLUTION INTRAMUSCULAR; INTRAVENOUS at 15:46

## 2019-04-29 RX ADMIN — LEVOTHYROXINE SODIUM 88 MCG: 88 TABLET ORAL at 08:48

## 2019-04-29 RX ADMIN — SODIUM CHLORIDE, PRESERVATIVE FREE 3 ML: 5 INJECTION INTRAVENOUS at 20:01

## 2019-04-29 RX ADMIN — PANTOPRAZOLE SODIUM 40 MG: 40 INJECTION, POWDER, FOR SOLUTION INTRAVENOUS at 08:48

## 2019-04-29 RX ADMIN — ROCURONIUM BROMIDE 30 MG: 10 INJECTION INTRAVENOUS at 14:43

## 2019-04-29 RX ADMIN — MORPHINE SULFATE 1 MG: 2 INJECTION, SOLUTION INTRAMUSCULAR; INTRAVENOUS at 17:50

## 2019-04-29 RX ADMIN — FENTANYL CITRATE 100 MCG: 50 INJECTION INTRAMUSCULAR; INTRAVENOUS at 14:40

## 2019-04-29 RX ADMIN — ATORVASTATIN CALCIUM 80 MG: 40 TABLET, FILM COATED ORAL at 08:48

## 2019-04-29 RX ADMIN — CEFTRIAXONE 2 G: 2 INJECTION, POWDER, FOR SOLUTION INTRAMUSCULAR; INTRAVENOUS at 18:37

## 2019-04-29 RX ADMIN — LIDOCAINE HYDROCHLORIDE 3 ML: 20 INJECTION, SOLUTION EPIDURAL; INFILTRATION; INTRACAUDAL; PERINEURAL at 14:42

## 2019-04-29 RX ADMIN — FENTANYL CITRATE 50 MCG: 50 INJECTION INTRAMUSCULAR; INTRAVENOUS at 16:10

## 2019-04-29 RX ADMIN — FENTANYL CITRATE 50 MCG: 50 INJECTION INTRAMUSCULAR; INTRAVENOUS at 15:55

## 2019-04-29 RX ADMIN — CEFTRIAXONE 2 G: 2 INJECTION, POWDER, FOR SOLUTION INTRAMUSCULAR; INTRAVENOUS at 14:46

## 2019-04-29 RX ADMIN — DEXAMETHASONE SODIUM PHOSPHATE 4 MG: 4 INJECTION, SOLUTION INTRAMUSCULAR; INTRAVENOUS at 15:46

## 2019-04-29 RX ADMIN — SODIUM CHLORIDE 75 ML/HR: 9 INJECTION, SOLUTION INTRAVENOUS at 17:50

## 2019-04-29 RX ADMIN — MORPHINE SULFATE 1 MG: 2 INJECTION, SOLUTION INTRAMUSCULAR; INTRAVENOUS at 21:25

## 2019-04-29 RX ADMIN — PROPOFOL 160 MG: 10 INJECTION, EMULSION INTRAVENOUS at 14:43

## 2019-04-29 RX ADMIN — NEOSTIGMINE METHYLSULFATE 3 MG: 1 INJECTION, SOLUTION INTRAVENOUS at 15:51

## 2019-04-29 RX ADMIN — METRONIDAZOLE 500 MG: 500 INJECTION, SOLUTION INTRAVENOUS at 20:01

## 2019-04-29 RX ADMIN — POTASSIUM & SODIUM PHOSPHATES POWDER PACK 280-160-250 MG 2 PACKET: 280-160-250 PACK at 17:50

## 2019-04-29 NOTE — ANESTHESIA POSTPROCEDURE EVALUATION
Patient: Chi Stanton    Procedure Summary     Date:  04/29/19 Room / Location:  [facility] OR 03 /  [facility] OR    Anesthesia Start:  1440 Anesthesia Stop:  1554    Procedures:       CHOLECYSTECTOMY LAPAROSCOPIC (N/A Abdomen)      ENDOSCOPIC RETROGRADE CHOLANGIOPANCREATOGRAPHY (N/A ) Diagnosis:       Acute cholecystitis      Hyperbilirubinemia      (Acute cholecystitis [K81.0])      (Hyperbilirubinemia [E80.6])    Surgeon:  José Luis Higgins MD; Wally Harry MD Provider:  Kevin Vera MD    Anesthesia Type:  general ASA Status:  3          Anesthesia Type: general  Last vitals  BP   147/73 (04/29/19 1645)   Temp   97.4 °F (36.3 °C) (04/29/19 1645)   Pulse   61 (04/29/19 1645)   Resp   18 (04/29/19 1645)     SpO2   95 % (04/29/19 1645)     Post Anesthesia Care and Evaluation    Patient location during evaluation: PACU  Patient participation: complete - patient participated  Level of consciousness: awake and alert  Pain score: 1  Pain management: adequate  Airway patency: patent  Anesthetic complications: No anesthetic complications  PONV Status: controlled  Cardiovascular status: acceptable  Respiratory status: acceptable  Hydration status: acceptable

## 2019-04-29 NOTE — ANESTHESIA PREPROCEDURE EVALUATION
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   Substance History - negative use     OB/GYN negative ob/gyn ROS         Other      history of cancer    ROS/Med Hx Other:  for over 30 years                  Anesthesia Plan    ASA 3     general     intravenous induction   Anesthetic plan, all risks, benefits, and alternatives have been provided, discussed and informed consent has been obtained with: patient and spouse/significant other.  Use of blood products discussed with patient and spouse/significant other  Consented to blood products.

## 2019-04-29 NOTE — ANESTHESIA PROCEDURE NOTES
Airway  Urgency: elective    Date/Time: 4/29/2019 2:45 PM  End Time:4/29/2019 2:45 PM  Airway not difficult    General Information and Staff    Patient location during procedure: OR  CRNA: Carl Brown CRNA    Indications and Patient Condition  Indications for airway management: airway protection    Preoxygenated: yes  MILS maintained throughout  Mask difficulty assessment: 0 - not attempted    Final Airway Details  Final airway type: endotracheal airway      Successful airway: ETT  Cuffed: yes   Successful intubation technique: direct laryngoscopy  Endotracheal tube insertion site: oral  Blade: Tyler  Blade size: 4  ETT size (mm): 7.5  Cormack-Lehane Classification: grade IIa - partial view of glottis  Placement verified by: chest auscultation, capnometry and palpation of cuff   Cuff volume (mL): 10  Measured from: lips  ETT to lips (cm): 22  Number of attempts at approach: 1    Additional Comments  Atraumatic

## 2019-04-30 LAB
ALBUMIN SERPL-MCNC: 2.84 G/DL (ref 3.5–5.2)
ALBUMIN/GLOB SERPL: 0.9 G/DL
ALP SERPL-CCNC: 181 U/L (ref 39–117)
ALT SERPL W P-5'-P-CCNC: 76 U/L (ref 1–41)
AMYLASE SERPL-CCNC: 24 U/L (ref 28–100)
ANION GAP SERPL CALCULATED.3IONS-SCNC: 14.2 MMOL/L
AST SERPL-CCNC: 54 U/L (ref 1–40)
BASOPHILS # BLD AUTO: 0.01 10*3/MM3 (ref 0–0.2)
BASOPHILS NFR BLD AUTO: 0.2 % (ref 0–1.5)
BILIRUB SERPL-MCNC: 4.8 MG/DL (ref 0.2–1.2)
BUN BLD-MCNC: 16 MG/DL (ref 8–23)
BUN/CREAT SERPL: 11.9 (ref 7–25)
CALCIUM SPEC-SCNC: 7.9 MG/DL (ref 8.6–10.5)
CHLORIDE SERPL-SCNC: 97 MMOL/L (ref 98–107)
CO2 SERPL-SCNC: 21.8 MMOL/L (ref 22–29)
CREAT BLD-MCNC: 1.34 MG/DL (ref 0.76–1.27)
CRP SERPL-MCNC: 11.51 MG/DL (ref 0–0.5)
DEPRECATED RDW RBC AUTO: 45.8 FL (ref 37–54)
EOSINOPHIL # BLD AUTO: 0 10*3/MM3 (ref 0–0.4)
EOSINOPHIL NFR BLD AUTO: 0 % (ref 0.3–6.2)
ERYTHROCYTE [DISTWIDTH] IN BLOOD BY AUTOMATED COUNT: 14.6 % (ref 12.3–15.4)
GFR SERPL CREATININE-BSD FRML MDRD: 52 ML/MIN/1.73
GLOBULIN UR ELPH-MCNC: 3.1 GM/DL
GLUCOSE BLD-MCNC: 142 MG/DL (ref 65–99)
GLUCOSE BLDC GLUCOMTR-MCNC: 119 MG/DL (ref 70–130)
GLUCOSE BLDC GLUCOMTR-MCNC: 128 MG/DL (ref 70–130)
GLUCOSE BLDC GLUCOMTR-MCNC: 142 MG/DL (ref 70–130)
GLUCOSE BLDC GLUCOMTR-MCNC: 146 MG/DL (ref 70–130)
HCT VFR BLD AUTO: 36.3 % (ref 37.5–51)
HGB BLD-MCNC: 12.2 G/DL (ref 13–17.7)
IMM GRANULOCYTES # BLD AUTO: 0.02 10*3/MM3 (ref 0–0.05)
IMM GRANULOCYTES NFR BLD AUTO: 0.3 % (ref 0–0.5)
INR PPP: 0.99 (ref 0.9–1.1)
LYMPHOCYTES # BLD AUTO: 0.27 10*3/MM3 (ref 0.7–3.1)
LYMPHOCYTES NFR BLD AUTO: 4.2 % (ref 19.6–45.3)
MCH RBC QN AUTO: 29 PG (ref 26.6–33)
MCHC RBC AUTO-ENTMCNC: 33.6 G/DL (ref 31.5–35.7)
MCV RBC AUTO: 86.4 FL (ref 79–97)
MONOCYTES # BLD AUTO: 0.56 10*3/MM3 (ref 0.1–0.9)
MONOCYTES NFR BLD AUTO: 8.7 % (ref 5–12)
NEUTROPHILS # BLD AUTO: 5.61 10*3/MM3 (ref 1.7–7)
NEUTROPHILS NFR BLD AUTO: 86.6 % (ref 42.7–76)
PHOSPHATE SERPL-MCNC: 3.7 MG/DL (ref 2.5–4.5)
PLATELET # BLD AUTO: 137 10*3/MM3 (ref 140–450)
PMV BLD AUTO: 10.5 FL (ref 6–12)
POTASSIUM BLD-SCNC: 4 MMOL/L (ref 3.5–5.2)
PROT SERPL-MCNC: 5.9 G/DL (ref 6–8.5)
PROTHROMBIN TIME: 13.3 SECONDS (ref 11–15.4)
RBC # BLD AUTO: 4.2 10*6/MM3 (ref 4.14–5.8)
SODIUM BLD-SCNC: 133 MMOL/L (ref 136–145)
WBC NRBC COR # BLD: 6.47 10*3/MM3 (ref 3.4–10.8)

## 2019-04-30 PROCEDURE — 86140 C-REACTIVE PROTEIN: CPT | Performed by: NURSE PRACTITIONER

## 2019-04-30 PROCEDURE — 99233 SBSQ HOSP IP/OBS HIGH 50: CPT | Performed by: INTERNAL MEDICINE

## 2019-04-30 PROCEDURE — 94799 UNLISTED PULMONARY SVC/PX: CPT

## 2019-04-30 PROCEDURE — 82150 ASSAY OF AMYLASE: CPT | Performed by: SURGERY

## 2019-04-30 PROCEDURE — 25010000002 MORPHINE PER 10 MG: Performed by: INTERNAL MEDICINE

## 2019-04-30 PROCEDURE — 85025 COMPLETE CBC W/AUTO DIFF WBC: CPT | Performed by: INTERNAL MEDICINE

## 2019-04-30 PROCEDURE — 80053 COMPREHEN METABOLIC PANEL: CPT | Performed by: INTERNAL MEDICINE

## 2019-04-30 PROCEDURE — 82962 GLUCOSE BLOOD TEST: CPT

## 2019-04-30 PROCEDURE — 25010000002 HEPARIN (PORCINE) PER 1000 UNITS: Performed by: PHYSICIAN ASSISTANT

## 2019-04-30 PROCEDURE — 99024 POSTOP FOLLOW-UP VISIT: CPT | Performed by: SURGERY

## 2019-04-30 PROCEDURE — 85610 PROTHROMBIN TIME: CPT | Performed by: SURGERY

## 2019-04-30 PROCEDURE — 84100 ASSAY OF PHOSPHORUS: CPT | Performed by: PHYSICIAN ASSISTANT

## 2019-04-30 RX ORDER — PANTOPRAZOLE SODIUM 40 MG/1
40 TABLET, DELAYED RELEASE ORAL
Status: DISCONTINUED | OUTPATIENT
Start: 2019-04-30 | End: 2019-05-02 | Stop reason: HOSPADM

## 2019-04-30 RX ORDER — METRONIDAZOLE 250 MG/1
500 TABLET ORAL EVERY 8 HOURS SCHEDULED
Status: DISCONTINUED | OUTPATIENT
Start: 2019-04-30 | End: 2019-05-02 | Stop reason: HOSPADM

## 2019-04-30 RX ORDER — CEFDINIR 300 MG/1
300 CAPSULE ORAL EVERY 12 HOURS SCHEDULED
Status: DISCONTINUED | OUTPATIENT
Start: 2019-04-30 | End: 2019-05-02 | Stop reason: HOSPADM

## 2019-04-30 RX ADMIN — CEFDINIR 300 MG: 300 CAPSULE ORAL at 19:43

## 2019-04-30 RX ADMIN — PANTOPRAZOLE SODIUM 40 MG: 40 TABLET, DELAYED RELEASE ORAL at 17:06

## 2019-04-30 RX ADMIN — METRONIDAZOLE 500 MG: 500 INJECTION, SOLUTION INTRAVENOUS at 04:41

## 2019-04-30 RX ADMIN — METRONIDAZOLE 500 MG: 250 TABLET ORAL at 14:32

## 2019-04-30 RX ADMIN — SODIUM CHLORIDE, PRESERVATIVE FREE 3 ML: 5 INJECTION INTRAVENOUS at 19:43

## 2019-04-30 RX ADMIN — MORPHINE SULFATE 1 MG: 2 INJECTION, SOLUTION INTRAMUSCULAR; INTRAVENOUS at 06:50

## 2019-04-30 RX ADMIN — PANTOPRAZOLE SODIUM 40 MG: 40 INJECTION, POWDER, FOR SOLUTION INTRAVENOUS at 09:15

## 2019-04-30 RX ADMIN — ATORVASTATIN CALCIUM 80 MG: 40 TABLET, FILM COATED ORAL at 09:15

## 2019-04-30 RX ADMIN — METRONIDAZOLE 500 MG: 250 TABLET ORAL at 19:42

## 2019-04-30 RX ADMIN — CEFDINIR 300 MG: 300 CAPSULE ORAL at 14:32

## 2019-04-30 RX ADMIN — SODIUM CHLORIDE 75 ML/HR: 9 INJECTION, SOLUTION INTRAVENOUS at 09:15

## 2019-04-30 RX ADMIN — HEPARIN SODIUM 5000 UNITS: 5000 INJECTION INTRAVENOUS; SUBCUTANEOUS at 09:15

## 2019-04-30 RX ADMIN — LEVOTHYROXINE SODIUM 88 MCG: 88 TABLET ORAL at 09:14

## 2019-04-30 RX ADMIN — Medication 1 TABLET: at 09:14

## 2019-04-30 RX ADMIN — HEPARIN SODIUM 5000 UNITS: 5000 INJECTION INTRAVENOUS; SUBCUTANEOUS at 19:43

## 2019-05-01 LAB
ALBUMIN SERPL-MCNC: 2.74 G/DL (ref 3.5–5.2)
ALBUMIN/GLOB SERPL: 0.7 G/DL
ALP SERPL-CCNC: 187 U/L (ref 39–117)
ALT SERPL W P-5'-P-CCNC: 79 U/L (ref 1–41)
ANION GAP SERPL CALCULATED.3IONS-SCNC: 12.5 MMOL/L
AST SERPL-CCNC: 74 U/L (ref 1–40)
BASOPHILS # BLD AUTO: 0.03 10*3/MM3 (ref 0–0.2)
BASOPHILS NFR BLD AUTO: 0.5 % (ref 0–1.5)
BILIRUB SERPL-MCNC: 3.1 MG/DL (ref 0.2–1.2)
BUN BLD-MCNC: 15 MG/DL (ref 8–23)
BUN/CREAT SERPL: 11.6 (ref 7–25)
CALCIUM SPEC-SCNC: 8.1 MG/DL (ref 8.6–10.5)
CHLORIDE SERPL-SCNC: 97 MMOL/L (ref 98–107)
CO2 SERPL-SCNC: 22.5 MMOL/L (ref 22–29)
CREAT BLD-MCNC: 1.29 MG/DL (ref 0.76–1.27)
CRP SERPL-MCNC: 13.7 MG/DL (ref 0–0.5)
DEPRECATED RDW RBC AUTO: 48.8 FL (ref 37–54)
EOSINOPHIL # BLD AUTO: 0.06 10*3/MM3 (ref 0–0.4)
EOSINOPHIL NFR BLD AUTO: 1.1 % (ref 0.3–6.2)
ERYTHROCYTE [DISTWIDTH] IN BLOOD BY AUTOMATED COUNT: 15.3 % (ref 12.3–15.4)
GFR SERPL CREATININE-BSD FRML MDRD: 54 ML/MIN/1.73
GLOBULIN UR ELPH-MCNC: 3.7 GM/DL
GLUCOSE BLD-MCNC: 119 MG/DL (ref 65–99)
GLUCOSE BLDC GLUCOMTR-MCNC: 101 MG/DL (ref 70–130)
GLUCOSE BLDC GLUCOMTR-MCNC: 113 MG/DL (ref 70–130)
GLUCOSE BLDC GLUCOMTR-MCNC: 121 MG/DL (ref 70–130)
GLUCOSE BLDC GLUCOMTR-MCNC: 142 MG/DL (ref 70–130)
HCT VFR BLD AUTO: 37.9 % (ref 37.5–51)
HGB BLD-MCNC: 12.4 G/DL (ref 13–17.7)
IMM GRANULOCYTES # BLD AUTO: 0.02 10*3/MM3 (ref 0–0.05)
IMM GRANULOCYTES NFR BLD AUTO: 0.4 % (ref 0–0.5)
LYMPHOCYTES # BLD AUTO: 0.74 10*3/MM3 (ref 0.7–3.1)
LYMPHOCYTES NFR BLD AUTO: 13.2 % (ref 19.6–45.3)
MCH RBC QN AUTO: 28.3 PG (ref 26.6–33)
MCHC RBC AUTO-ENTMCNC: 32.7 G/DL (ref 31.5–35.7)
MCV RBC AUTO: 86.5 FL (ref 79–97)
MONOCYTES # BLD AUTO: 0.83 10*3/MM3 (ref 0.1–0.9)
MONOCYTES NFR BLD AUTO: 14.8 % (ref 5–12)
NEUTROPHILS # BLD AUTO: 3.94 10*3/MM3 (ref 1.7–7)
NEUTROPHILS NFR BLD AUTO: 70 % (ref 42.7–76)
PLATELET # BLD AUTO: 147 10*3/MM3 (ref 140–450)
PMV BLD AUTO: 10.8 FL (ref 6–12)
POTASSIUM BLD-SCNC: 3.7 MMOL/L (ref 3.5–5.2)
PROT SERPL-MCNC: 6.4 G/DL (ref 6–8.5)
RBC # BLD AUTO: 4.38 10*6/MM3 (ref 4.14–5.8)
SODIUM BLD-SCNC: 132 MMOL/L (ref 136–145)
WBC NRBC COR # BLD: 5.62 10*3/MM3 (ref 3.4–10.8)

## 2019-05-01 PROCEDURE — 80053 COMPREHEN METABOLIC PANEL: CPT | Performed by: SURGERY

## 2019-05-01 PROCEDURE — 82962 GLUCOSE BLOOD TEST: CPT

## 2019-05-01 PROCEDURE — 85025 COMPLETE CBC W/AUTO DIFF WBC: CPT | Performed by: INTERNAL MEDICINE

## 2019-05-01 PROCEDURE — 25010000002 HEPARIN (PORCINE) PER 1000 UNITS: Performed by: PHYSICIAN ASSISTANT

## 2019-05-01 PROCEDURE — 86140 C-REACTIVE PROTEIN: CPT | Performed by: NURSE PRACTITIONER

## 2019-05-01 PROCEDURE — 99232 SBSQ HOSP IP/OBS MODERATE 35: CPT | Performed by: INTERNAL MEDICINE

## 2019-05-01 RX ORDER — ASPIRIN 81 MG/1
81 TABLET ORAL DAILY
Status: DISCONTINUED | OUTPATIENT
Start: 2019-05-01 | End: 2019-05-02 | Stop reason: HOSPADM

## 2019-05-01 RX ADMIN — SODIUM CHLORIDE, PRESERVATIVE FREE 3 ML: 5 INJECTION INTRAVENOUS at 07:39

## 2019-05-01 RX ADMIN — PANTOPRAZOLE SODIUM 40 MG: 40 TABLET, DELAYED RELEASE ORAL at 05:09

## 2019-05-01 RX ADMIN — CEFDINIR 300 MG: 300 CAPSULE ORAL at 07:39

## 2019-05-01 RX ADMIN — LEVOTHYROXINE SODIUM 88 MCG: 88 TABLET ORAL at 07:40

## 2019-05-01 RX ADMIN — METRONIDAZOLE 500 MG: 250 TABLET ORAL at 20:31

## 2019-05-01 RX ADMIN — ATORVASTATIN CALCIUM 80 MG: 40 TABLET, FILM COATED ORAL at 07:39

## 2019-05-01 RX ADMIN — PANTOPRAZOLE SODIUM 40 MG: 40 TABLET, DELAYED RELEASE ORAL at 16:13

## 2019-05-01 RX ADMIN — METRONIDAZOLE 500 MG: 250 TABLET ORAL at 05:09

## 2019-05-01 RX ADMIN — HEPARIN SODIUM 5000 UNITS: 5000 INJECTION INTRAVENOUS; SUBCUTANEOUS at 07:39

## 2019-05-01 RX ADMIN — ASPIRIN 81 MG: 81 TABLET ORAL at 14:29

## 2019-05-01 RX ADMIN — Medication 1 TABLET: at 07:39

## 2019-05-01 RX ADMIN — HEPARIN SODIUM 5000 UNITS: 5000 INJECTION INTRAVENOUS; SUBCUTANEOUS at 20:31

## 2019-05-01 RX ADMIN — METRONIDAZOLE 500 MG: 250 TABLET ORAL at 11:34

## 2019-05-01 RX ADMIN — CEFDINIR 300 MG: 300 CAPSULE ORAL at 20:31

## 2019-05-01 NOTE — PROGRESS NOTES
PROGRESS NOTE         Patient Identification:  Name:  Chi Stanton  Age:  73 y.o.  Sex:  male  :  1945  MRN:  [id number]  Visit Number:  [id number]  Primary Care Provider:  Duran Rm APRN      ----------------------------------------------------------------------------------------------------------------------  Subjective       Chief Complaints:    Called To ER        Interval History:      Patient doing well, no issues or complains. Afebrile. No diarrhea. WBC normal. CRP elevated at 13.7.     Review of Systems:    Constitutional: no fever, chills and night sweats. No appetite change or unexpected weight change. No fatigue.  Eyes: no eye drainage, itching or redness.  HEENT: no mouth sores, dysphagia or nose bleed.  Respiratory: no for shortness of breath, cough or production of sputum.  Cardiovascular: no chest pain, no palpitations, no orthopnea.  Gastrointestinal: no nausea, vomiting or diarrhea. Positive abdominal tenderness, No hematemesis or rectal bleeding.  Genitourinary: no dysuria or polyuria.  Hematologic/lymphatic: no lymph node abnormalities, no easy bruising or easy bleeding.  Musculoskeletal: no muscle or joint pain.  Skin: No rash and no itching.  Neurological: no loss of consciousness, no seizure, no headache.  Behavioral/Psych: no depression or suicidal ideation.  Endocrine: no hot flashes.  Immunologic: negative.  ----------------------------------------------------------------------------------------------------------------------      Objective       Current [facility] Meds:    atorvastatin 80 mg Oral Daily   cefdinir 300 mg Oral Q12H   heparin (porcine) 5,000 Units Subcutaneous Q12H   levothyroxine 88 mcg Oral Daily   metroNIDAZOLE 500 mg Oral Q8H   multivitamin 1 tablet Oral Daily   pantoprazole 40 mg Oral BID AC   sodium chloride 3 mL Intravenous Q12H         ----------------------------------------------------------------------------------------------------------------------    Vital Signs:  Temp:  [97.8 °F (36.6 °C)-98.7 °F (37.1 °C)] 98.6 °F (37 °C)  Heart Rate:  [55-63] 63  Resp:  [18-20] 18  BP: (136-140)/(69-74) 137/73  Mean Arterial Pressure (Non-Invasive) for the past 24 hrs (Last 3 readings):   Noninvasive MAP (mmHg)   04/30/19 1429 102     SpO2 Percentage    04/30/19 1429 04/30/19 1830 04/30/19 2218   SpO2: 96% 97% 96%     SpO2:  [96 %-97 %] 96 %  on   ;   Device (Oxygen Therapy): room air    Body mass index is 31.27 kg/m².  Wt Readings from Last 3 Encounters:   05/01/19 108 kg (237 lb)   04/26/19 109 kg (240 lb)   12/12/18 109 kg (240 lb)        Intake/Output Summary (Last 24 hours) at 5/1/2019 1059  Last data filed at 5/1/2019 0900  Gross per 24 hour   Intake 480 ml   Output 550 ml   Net -70 ml     Diet Regular; Consistent Carbohydrate  ----------------------------------------------------------------------------------------------------------------------    Physical exam:      Constitutional:  Well-developed and well-nourished.  No respiratory distress.      HENT:  Head: Normocephalic and atraumatic.  Mouth:  Moist mucous membranes.    Eyes:  Conjunctivae and EOM are normal.  No scleral icterus.  Neck:  Neck supple.  No JVD present.    Cardiovascular:  Normal rate, regular rhythm and normal heart sounds with no murmur. No edema.  Pulmonary/Chest:  No respiratory distress, no wheezes, no crackles, with normal breath sounds and good air movement.  Abdominal:  Soft.  Bowel sounds are normal.  No distension. Positive tenderness.   Musculoskeletal:  No edema, no tenderness, and no deformity.  No swelling or redness of joints.  Neurological:  Alert and oriented to person, place, and time.  No facial droop.  No slurred speech.   Skin:  Skin is warm and dry.  No rash noted.  No pallor.   Psychiatric:  Normal mood and affect.  Behavior is  normal.     ----------------------------------------------------------------------------------------------------------------------    ----------------------------------------------------------------------------------------------------------------------  Results from last 7 days   Lab Units 04/27/19  1533 04/26/19  2152   TROPONIN T ng/mL <0.010 <0.010           Results from last 7 days   Lab Units 05/01/19  0423 04/30/19  0501 04/30/19  0500 04/29/19  0102  04/27/19  1836 04/27/19  1533 04/26/19  2221   CRP mg/dL 13.70* 11.51*  --  17.18*   < >  --  14.73*  --    LACTATE mmol/L  --   --   --   --   --   --  1.4 1.9   WBC 10*3/mm3 5.62  --  6.47 4.94   < >  --  8.71  --    HEMOGLOBIN g/dL 12.4*  --  12.2* 11.6*   < >  --  13.5  --    HEMATOCRIT % 37.9  --  36.3* 35.8*   < >  --  41.2  --    MCV fL 86.5  --  86.4 88.0   < >  --  88.4  --    MCHC g/dL 32.7  --  33.6 32.4   < >  --  32.8  --    PLATELETS 10*3/mm3 147  --  137* 129*   < >  --  131*  --    INR   --   --  0.99  --   --  1.23*  --   --     < > = values in this interval not displayed.     Results from last 7 days   Lab Units 05/01/19  0423 04/30/19  0501 04/29/19  0102 04/28/19  0312   SODIUM mmol/L 132* 133* 134* 133*   POTASSIUM mmol/L 3.7 4.0 3.5 3.8   MAGNESIUM mg/dL  --   --  1.8 1.5*   CHLORIDE mmol/L 97* 97* 98 96*   CO2 mmol/L 22.5 21.8* 21.8* 23.7   BUN mg/dL 15 16 23 31*   CREATININE mg/dL 1.29* 1.34* 1.50* 2.04*   EGFR IF NONAFRICN AM mL/min/1.73 54* 52* 46* 32*   CALCIUM mg/dL 8.1* 7.9* 8.1* 7.5*   GLUCOSE mg/dL 119* 142* 103* 94   ALBUMIN g/dL 2.74* 2.84* 2.84* 2.83*   BILIRUBIN mg/dL 3.1* 4.8* 4.3* 6.2*   ALK PHOS U/L 187* 181* 159* 156*   AST (SGOT) U/L 74* 54* 33 49*   ALT (SGPT) U/L 79* 76* 83* 117*   Estimated Creatinine Clearance: 64.7 mL/min (A) (by C-G formula based on SCr of 1.29 mg/dL (H)).  No results found for: AMMONIA    Glucose   Date/Time Value Ref Range Status   05/01/2019 0652 101 70 - 130 mg/dL Final   04/30/2019 1923 142 (H)  70 - 130 mg/dL Final   04/30/2019 1637 146 (H) 70 - 130 mg/dL Final   04/30/2019 1156 128 70 - 130 mg/dL Final   04/30/2019 0812 119 70 - 130 mg/dL Final   04/29/2019 1926 109 70 - 130 mg/dL Final   04/29/2019 1707 92 70 - 130 mg/dL Final   04/29/2019 1141 88 70 - 130 mg/dL Final     Lab Results   Component Value Date    HGBA1C 6.70 (H) 04/27/2019     Lab Results   Component Value Date    TSH 5.750 (H) 04/27/2019    FREET4 0.98 04/28/2019       Blood Culture   Date Value Ref Range Status   04/27/2019 No growth at 24 hours  Preliminary   04/27/2019 No growth at 24 hours  Preliminary   04/26/2019 Escherichia coli (A)  Final   04/26/2019 Escherichia coli (A)  Final     Urine Culture   Date Value Ref Range Status   04/27/2019 No growth  Final              Pain Management Panel     Pain Management Panel Latest Ref Rng & Units 4/28/2019 2/13/2014    CREATININE UR mg/dL 54.8 -    AMPHETAMINES SCREEN, URINE NEGATIVE - Negative    BARBITURATES SCREEN NEGATIVE - Negative    BENZODIAZEPINE SCREEN, URINE NEGATIVE - Negative    COCAINE SCREEN, URINE NEGATIVE - Negative    METHADONE SCREEN, URINE NEGATIVE - Negative          I have personally reviewed the above laboratory results.   ----------------------------------------------------------------------------------------------------------------------  Imaging Results (last 24 hours)     ** No results found for the last 24 hours. **        I have personally reviewed the above radiology results.   ----------------------------------------------------------------------------------------------------------------------    Assessment/Plan       Assessment/Plan     ASSESSMENT:    1. Acute cholecystitis  2. E. Coli Bacteremia       PLAN:    Patient doing well, no issues or complains. Afebrile. No diarrhea. WBC normal. CRP elevated at 13.7, likely related to recent surgery.    PSA normal. Urine culture finalized as no growth.     Blood cultures from 4/26/19 2 out of 2 sets positive for E.  Coli. Chest x-ray unremarkable. CT of abdomen and pelvis reveals gallstones and a questionable 6mm density near the sphincter of Edwin that is possibly an obstructing stone. Ultrasound of gallbladder reveals gallstones and sludge in the gallbladder. Renal ultrasound unremarkable.     Patient's bacteremia origin is likely related to acute cholecystitis.    Recommen to continue Flagyl 500mg PO Q8H and Omnicef 300mg PO BID  through 5/6/19 to allow for intraabdominal coverage. Patient stable from ID standpoint. CRP in AM.      Current Antimicrobials:  Omnicef 300mg PO BID through 5/6/19  Flagyl 500mg PO Q8H through 5/6/19      Code Status:   Code Status and Medical Interventions:   Ordered at: 04/27/19 1757     Code Status:    CPR     Medical Interventions (Level of Support Prior to Arrest):    Full       KARTIK Dickerson  05/01/19  10:59 AM

## 2019-05-01 NOTE — PLAN OF CARE
Problem: Infection, Risk/Actual (Adult)  Goal: Infection Prevention/Resolution  Outcome: Ongoing (interventions implemented as appropriate)      Problem: Pain, Acute (Adult)  Goal: Identify Related Risk Factors and Signs and Symptoms  Outcome: Ongoing (interventions implemented as appropriate)    Goal: Acceptable Pain Control/Comfort Level  Outcome: Ongoing (interventions implemented as appropriate)      Problem: Nausea/Vomiting (Adult)  Goal: Identify Related Risk Factors and Signs and Symptoms  Outcome: Ongoing (interventions implemented as appropriate)    Goal: Symptom Relief  Outcome: Ongoing (interventions implemented as appropriate)    Goal: Adequate Hydration  Outcome: Ongoing (interventions implemented as appropriate)      Problem: Activity Intolerance (Adult)  Goal: Identify Related Risk Factors and Signs and Symptoms  Outcome: Ongoing (interventions implemented as appropriate)    Goal: Activity Tolerance  Outcome: Ongoing (interventions implemented as appropriate)    Goal: Effective Energy Conservation Techniques  Outcome: Ongoing (interventions implemented as appropriate)      Problem: Patient Care Overview  Goal: Plan of Care Review  Outcome: Ongoing (interventions implemented as appropriate)    Goal: Individualization and Mutuality  Outcome: Ongoing (interventions implemented as appropriate)    Goal: Discharge Needs Assessment  Outcome: Ongoing (interventions implemented as appropriate)    Goal: Interprofessional Rounds/Family Conf  Outcome: Ongoing (interventions implemented as appropriate)      Problem: Fall Risk (Adult)  Goal: Identify Related Risk Factors and Signs and Symptoms  Outcome: Ongoing (interventions implemented as appropriate)    Goal: Absence of Fall  Outcome: Ongoing (interventions implemented as appropriate)

## 2019-05-01 NOTE — PROGRESS NOTES
Discharge Planning Assessment   Michael     Patient Name: Chi Stanton  MRN: [id number]  Today's Date: 5/1/2019    Admit Date: 4/27/2019    Discharge Needs Assessment    No documentation.       Discharge Plan     Row Name 05/01/19 1242       Plan    Plan  CM follow up visit: Pt is POD #2. He is sitting up in bedside chair with wife, Johanna, at bedside. Pt reports feeling better since admission. He is carmita. diet and indep. ambulating. Pt lives at home with spouse and will return home at dc. Pt has good support at home. Wife will provide transportation at dc.  Wife and pt deny any anticipated dc needs at this time. CM will follow and assist, if needed.    Patient/Family in Agreement with Plan  yes        Destination      No service coordination in this encounter.      Durable Medical Equipment      No service coordination in this encounter.      Dialysis/Infusion      No service coordination in this encounter.      Home Medical Care      No service coordination in this encounter.      Therapy      No service coordination in this encounter.      Community Resources      No service coordination in this encounter.        Expected Discharge Date and Time     Expected Discharge Date Expected Discharge Time    May 1, 2019         Demographic Summary    No documentation.       Functional Status    No documentation.       Psychosocial    No documentation.       Abuse/Neglect    No documentation.       Legal    No documentation.       Substance Abuse    No documentation.       Patient Forms    No documentation.           Alisson Reyes RN

## 2019-05-01 NOTE — NURSING NOTE
Chi Stanton was seen/examined with FADIA Hurtado, at shift change. Pt is AxOx4 and refused their head-to-toe skin assessment at this time. Pt does have 3 band-aids on abdomen from laparoscopic lisa procedure previously done.  Pt denies any skin issues or changes since being admitted.

## 2019-05-01 NOTE — PROGRESS NOTES
"  Assisted By: Shawn LOAIZA    CC: F/U sepsis    Interview History/HPI: Patient states he is feeling better, he has been ambulating independently.  He has had flatus but still no bowel movement since surgery.  He states his abdominal \"soreness\" in his right upper quadrant is significant improved over yesterday.  He is tolerated now a regular diet.  He denies chest pain or shortness of breath.    Vitals:    05/01/19 0258   BP: 137/73   Pulse: 63   Resp: 18   Temp: 98.6 °F (37 °C)   SpO2:        Intake/Output Summary (Last 24 hours) at 5/1/2019 1157  Last data filed at 5/1/2019 0900  Gross per 24 hour   Intake 480 ml   Output 550 ml   Net -70 ml       EXAM: Hearing is diminished, this is chronic, he appears in no distress, no jaundice noted, lungs have bilateral breath sounds that are clear heart regular rate and rhythm without murmur.  Abdomen is soft bowel sounds are active minimal right upper quadrant tenderness to palpation    Tele: Sinus, reviewed    LABS:   Lab Results (last 48 hours)     Procedure Component Value Units Date/Time    POC Glucose Once [[id number]]  (Normal) Collected:  05/01/19 1109    Specimen:  Blood Updated:  05/01/19 1119     Glucose 113 mg/dL     POC Glucose Once [[id number]]  (Normal) Collected:  05/01/19 0652    Specimen:  Blood Updated:  05/01/19 0659     Glucose 101 mg/dL     Comprehensive Metabolic Panel [[id number]]  (Abnormal) Collected:  05/01/19 0423    Specimen:  Blood Updated:  05/01/19 0522     Glucose 119 mg/dL      BUN 15 mg/dL      Creatinine 1.29 mg/dL      Sodium 132 mmol/L      Potassium 3.7 mmol/L      Chloride 97 mmol/L      CO2 22.5 mmol/L      Calcium 8.1 mg/dL      Total Protein 6.4 g/dL      Albumin 2.74 g/dL      ALT (SGPT) 79 U/L      AST (SGOT) 74 U/L      Alkaline Phosphatase 187 U/L      Total Bilirubin 3.1 mg/dL      eGFR Non African Amer 54 mL/min/1.73      Globulin 3.7 gm/dL      A/G Ratio 0.7 g/dL      BUN/Creatinine Ratio 11.6     Anion Gap 12.5 mmol/L     Narrative: "       GFR Normal >60  Chronic Kidney Disease <60  Kidney Failure <15    C-reactive Protein [[id number]]  (Abnormal) Collected:  05/01/19 0423    Specimen:  Blood Updated:  05/01/19 0522     C-Reactive Protein 13.70 mg/dL     CBC & Differential [[id number]] Collected:  05/01/19 0423    Specimen:  Blood Updated:  05/01/19 0500    Narrative:       The following orders were created for panel order CBC & Differential.  Procedure                               Abnormality         Status                     ---------                               -----------         ------                     CBC Auto Differential[[id number]]        Abnormal            Final result                 Please view results for these tests on the individual orders.    CBC Auto Differential [[id number]]  (Abnormal) Collected:  05/01/19 0423    Specimen:  Blood Updated:  05/01/19 0500     WBC 5.62 10*3/mm3      RBC 4.38 10*6/mm3      Hemoglobin 12.4 g/dL      Hematocrit 37.9 %      MCV 86.5 fL      MCH 28.3 pg      MCHC 32.7 g/dL      RDW 15.3 %      RDW-SD 48.8 fl      MPV 10.8 fL      Platelets 147 10*3/mm3      Neutrophil % 70.0 %      Lymphocyte % 13.2 %      Monocyte % 14.8 %      Eosinophil % 1.1 %      Basophil % 0.5 %      Immature Grans % 0.4 %      Neutrophils, Absolute 3.94 10*3/mm3      Lymphocytes, Absolute 0.74 10*3/mm3      Monocytes, Absolute 0.83 10*3/mm3      Eosinophils, Absolute 0.06 10*3/mm3      Basophils, Absolute 0.03 10*3/mm3      Immature Grans, Absolute 0.02 10*3/mm3     POC Glucose Once [[id number]]  (Abnormal) Collected:  04/30/19 1923    Specimen:  Blood Updated:  04/30/19 1933     Glucose 142 mg/dL     POC Glucose Once [[id number]]  (Abnormal) Collected:  04/30/19 1637    Specimen:  Blood Updated:  04/30/19 1643     Glucose 146 mg/dL     Blood Culture - Blood, Arm, Left [[id number]] Collected:  04/27/19 1523    Specimen:  Blood from Arm, Left Updated:  04/30/19 1546     Blood Culture No growth at 3 days    Blood Culture -  Blood, Hand, Left [[id number]] Collected:  04/27/19 1533    Specimen:  Blood from Hand, Left Updated:  04/30/19 1546     Blood Culture No growth at 3 days    POC Glucose Once [[id number]]  (Normal) Collected:  04/30/19 1156    Specimen:  Blood Updated:  04/30/19 1204     Glucose 128 mg/dL     Phosphorus [[id number]]  (Normal) Collected:  04/30/19 0501    Specimen:  Blood Updated:  04/30/19 1007     Phosphorus 3.7 mg/dL     POC Glucose Once [[id number]]  (Normal) Collected:  04/30/19 0812    Specimen:  Blood Updated:  04/30/19 0819     Glucose 119 mg/dL     Comprehensive Metabolic Panel [[id number]]  (Abnormal) Collected:  04/30/19 0501    Specimen:  Blood Updated:  04/30/19 0552     Glucose 142 mg/dL      BUN 16 mg/dL      Creatinine 1.34 mg/dL      Sodium 133 mmol/L      Potassium 4.0 mmol/L      Chloride 97 mmol/L      CO2 21.8 mmol/L      Calcium 7.9 mg/dL      Total Protein 5.9 g/dL      Albumin 2.84 g/dL      ALT (SGPT) 76 U/L      AST (SGOT) 54 U/L      Alkaline Phosphatase 181 U/L      Total Bilirubin 4.8 mg/dL      eGFR Non African Amer 52 mL/min/1.73      Globulin 3.1 gm/dL      A/G Ratio 0.9 g/dL      BUN/Creatinine Ratio 11.9     Anion Gap 14.2 mmol/L     Narrative:       GFR Normal >60  Chronic Kidney Disease <60  Kidney Failure <15    Amylase [[id number]]  (Abnormal) Collected:  04/30/19 0501    Specimen:  Blood Updated:  04/30/19 0550     Amylase 24 U/L     C-reactive Protein [[id number]]  (Abnormal) Collected:  04/30/19 0501    Specimen:  Blood Updated:  04/30/19 0550     C-Reactive Protein 11.51 mg/dL     Protime-INR [[id number]]  (Normal) Collected:  04/30/19 0500    Specimen:  Blood Updated:  04/30/19 0538     Protime 13.3 Seconds      INR 0.99    Narrative:       Suggested INR therapeutic range for stable oral anticoagulant therapy:    Low Intensity therapy:   1.5-2.0  Moderate Intensity therapy:   2.0-3.0  High Intensity therapy:   2.5-4.0    CBC & Differential [[id number]] Collected:  04/30/19 0509     Specimen:  Blood Updated:  04/30/19 0529    Narrative:       The following orders were created for panel order CBC & Differential.  Procedure                               Abnormality         Status                     ---------                               -----------         ------                     CBC Auto Differential[[id number]]        Abnormal            Final result                 Please view results for these tests on the individual orders.    CBC Auto Differential [[id number]]  (Abnormal) Collected:  04/30/19 0500    Specimen:  Blood Updated:  04/30/19 0529     WBC 6.47 10*3/mm3      RBC 4.20 10*6/mm3      Hemoglobin 12.2 g/dL      Hematocrit 36.3 %      MCV 86.4 fL      MCH 29.0 pg      MCHC 33.6 g/dL      RDW 14.6 %      RDW-SD 45.8 fl      MPV 10.5 fL      Platelets 137 10*3/mm3      Neutrophil % 86.6 %      Lymphocyte % 4.2 %      Monocyte % 8.7 %      Eosinophil % 0.0 %      Basophil % 0.2 %      Immature Grans % 0.3 %      Neutrophils, Absolute 5.61 10*3/mm3      Lymphocytes, Absolute 0.27 10*3/mm3      Monocytes, Absolute 0.56 10*3/mm3      Eosinophils, Absolute 0.00 10*3/mm3      Basophils, Absolute 0.01 10*3/mm3      Immature Grans, Absolute 0.02 10*3/mm3     POC Glucose Once [[id number]]  (Normal) Collected:  04/29/19 1926    Specimen:  Blood Updated:  04/29/19 1935     Glucose 109 mg/dL     POC Glucose Once [[id number]]  (Normal) Collected:  04/29/19 1707    Specimen:  Blood Updated:  04/29/19 1714     Glucose 92 mg/dL     Surgical Pathology Exam [[id number]] Collected:  04/29/19 1538    Specimen:  Tissue from Gallbladder Updated:  04/29/19 1602    Protein / Creatinine Ratio, Urine - Urine, Clean Catch [[id number]]  (Abnormal) Collected:  04/28/19 1556    Specimen:  Urine, Clean Catch Updated:  04/29/19 1158     Protein/Creatinine Ratio, Urine 219.0 mg/G Crea      Creatinine, Urine 54.8 mg/dL      Total Protein, Urine 12.0 mg/dL           Radiology:  Imaging Results (last 72 hours)     Procedure  Component Value Units Date/Time    FL Surgery Fluoro [[id number]] Collected:  04/29/19 1546     Updated:  04/29/19 1549    Narrative:       EXAMINATION: FL SURGERY FLUORO-      CLINICAL INDICATION: ERCP; R78.81-Bacteremia; K81.0-Acute cholecystitis;  E80.6-Other disorders of bilirubin metabolism; K81.0-Acute cholecystitis        COMPARISON: None available.     Total fluoroscopy time 117.5 seconds.     Fluoroscopy was provided and 6 images were acquired during ERCP.     FINDINGS: No focal filling defect is seen.     No significant dilatation.     For a full procedural report, please see the report provided by the  performing physician.      This report was finalized on 4/29/2019 3:47 PM by Dr. Gokul Ledezma MD.               Results for orders placed during the hospital encounter of 04/27/19   Adult Transthoracic Echo Complete W/ Cont if Necessary Per Protocol    Narrative · Left ventricular systolic function is normal. Estimated EF appears to be   in the range of 51 - 55%.  · Left ventricular wall thickness is consistent with mild concentric   hypertrophy.  · Mild tricuspid valve regurgitation is present.  · Left atrial cavity size is mildly dilated.  · Aortic sclerosis with no stenosis. Noted echodense lesion in non cusp   likely representing sclerotic changes.  · There is no evidence of pericardial effusion            Assessment/Plan:   E. coli bacteremia associated with cholecystitis cholelithiasis and choledocholithiasis.  Status post ERCP with stone extraction as well as cholecystectomy.  Bilirubin is trending downward, no transaminases have risen slightly but not unexpected.  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  Clinically patient is doing better, CRP did trend upward but again not necessarily surprising considering his postop  status.  White count is normal.  No fever overnight.  We will continue to observe 24 more hours, would anticipate discharge tomorrow, discussed this case with Dr. Higgins, completing Omnicef and Flagyl course through 5/6 at ID recommendation    DVT prophylaxis, subcu heparin    Coronary artery disease status post bypass grafting, because of rising transaminases, Lipitor being held, I am restarting his low-dose aspirin as he is over 24 hours out from surgery.    Hypothyroidism, slightly under replaced on admission but may have an element of euthyroid sick, recheck    Anticipate discharge tomorrow    Chronic kidney disease with acute kidney injury on admission, stable to improved    Mild hyponatremia, stable follow off IV fluids, he appears euvolemic    Chronic hearing loss

## 2019-05-02 VITALS
TEMPERATURE: 98.2 F | WEIGHT: 237.2 LBS | HEIGHT: 73 IN | HEART RATE: 53 BPM | DIASTOLIC BLOOD PRESSURE: 79 MMHG | BODY MASS INDEX: 31.44 KG/M2 | RESPIRATION RATE: 16 BRPM | OXYGEN SATURATION: 96 % | SYSTOLIC BLOOD PRESSURE: 153 MMHG

## 2019-05-02 LAB
ALBUMIN SERPL-MCNC: 3.06 G/DL (ref 3.5–5.2)
ALBUMIN/GLOB SERPL: 1 G/DL
ALP SERPL-CCNC: 177 U/L (ref 39–117)
ALT SERPL W P-5'-P-CCNC: 63 U/L (ref 1–41)
ANION GAP SERPL CALCULATED.3IONS-SCNC: 13 MMOL/L
AST SERPL-CCNC: 44 U/L (ref 1–40)
BACTERIA SPEC AEROBE CULT: NORMAL
BACTERIA SPEC AEROBE CULT: NORMAL
BILIRUB SERPL-MCNC: 1.8 MG/DL (ref 0.2–1.2)
BUN BLD-MCNC: 17 MG/DL (ref 8–23)
BUN/CREAT SERPL: 14.8 (ref 7–25)
CALCIUM SPEC-SCNC: 8.7 MG/DL (ref 8.6–10.5)
CHLORIDE SERPL-SCNC: 102 MMOL/L (ref 98–107)
CO2 SERPL-SCNC: 21 MMOL/L (ref 22–29)
CREAT BLD-MCNC: 1.15 MG/DL (ref 0.76–1.27)
CRP SERPL-MCNC: 9.12 MG/DL (ref 0–0.5)
GFR SERPL CREATININE-BSD FRML MDRD: 62 ML/MIN/1.73
GLOBULIN UR ELPH-MCNC: 2.9 GM/DL
GLUCOSE BLD-MCNC: 113 MG/DL (ref 65–99)
GLUCOSE BLDC GLUCOMTR-MCNC: 109 MG/DL (ref 70–130)
POTASSIUM BLD-SCNC: 3.8 MMOL/L (ref 3.5–5.2)
PROT SERPL-MCNC: 6 G/DL (ref 6–8.5)
SODIUM BLD-SCNC: 136 MMOL/L (ref 136–145)
T4 FREE SERPL-MCNC: 1.52 NG/DL (ref 0.93–1.7)
TSH SERPL DL<=0.05 MIU/L-ACNC: 11.61 MIU/ML (ref 0.27–4.2)

## 2019-05-02 PROCEDURE — 84439 ASSAY OF FREE THYROXINE: CPT | Performed by: INTERNAL MEDICINE

## 2019-05-02 PROCEDURE — 82962 GLUCOSE BLOOD TEST: CPT

## 2019-05-02 PROCEDURE — 86140 C-REACTIVE PROTEIN: CPT | Performed by: NURSE PRACTITIONER

## 2019-05-02 PROCEDURE — 25010000002 HEPARIN (PORCINE) PER 1000 UNITS: Performed by: PHYSICIAN ASSISTANT

## 2019-05-02 PROCEDURE — 99238 HOSP IP/OBS DSCHRG MGMT 30/<: CPT | Performed by: INTERNAL MEDICINE

## 2019-05-02 PROCEDURE — 80053 COMPREHEN METABOLIC PANEL: CPT | Performed by: INTERNAL MEDICINE

## 2019-05-02 PROCEDURE — 94799 UNLISTED PULMONARY SVC/PX: CPT

## 2019-05-02 PROCEDURE — 84443 ASSAY THYROID STIM HORMONE: CPT | Performed by: INTERNAL MEDICINE

## 2019-05-02 RX ORDER — LEVOTHYROXINE SODIUM 0.1 MG/1
100 TABLET ORAL DAILY
Qty: 30 TABLET | Refills: 0 | Status: SHIPPED | OUTPATIENT
Start: 2019-05-02 | End: 2021-01-15 | Stop reason: DRUGHIGH

## 2019-05-02 RX ORDER — AMLODIPINE BESYLATE 5 MG/1
5 TABLET ORAL DAILY
Qty: 30 TABLET | Refills: 0 | Status: SHIPPED | OUTPATIENT
Start: 2019-05-02 | End: 2021-01-15 | Stop reason: ALTCHOICE

## 2019-05-02 RX ORDER — METRONIDAZOLE 500 MG/1
500 TABLET ORAL EVERY 8 HOURS SCHEDULED
Qty: 15 TABLET | Refills: 0 | Status: SHIPPED | OUTPATIENT
Start: 2019-05-02 | End: 2019-05-07

## 2019-05-02 RX ORDER — CEFDINIR 300 MG/1
300 CAPSULE ORAL EVERY 12 HOURS SCHEDULED
Qty: 9 CAPSULE | Refills: 0 | Status: SHIPPED | OUTPATIENT
Start: 2019-05-02 | End: 2019-05-07

## 2019-05-02 RX ADMIN — HEPARIN SODIUM 5000 UNITS: 5000 INJECTION INTRAVENOUS; SUBCUTANEOUS at 08:00

## 2019-05-02 RX ADMIN — Medication 1 TABLET: at 08:00

## 2019-05-02 RX ADMIN — LEVOTHYROXINE SODIUM 88 MCG: 88 TABLET ORAL at 08:00

## 2019-05-02 RX ADMIN — CEFDINIR 300 MG: 300 CAPSULE ORAL at 08:00

## 2019-05-02 RX ADMIN — ASPIRIN 81 MG: 81 TABLET ORAL at 08:00

## 2019-05-02 RX ADMIN — PANTOPRAZOLE SODIUM 40 MG: 40 TABLET, DELAYED RELEASE ORAL at 05:05

## 2019-05-02 RX ADMIN — METRONIDAZOLE 500 MG: 250 TABLET ORAL at 05:05

## 2019-05-02 NOTE — PROGRESS NOTES
PROGRESS NOTE         Patient Identification:  Name:  Chi Stanton  Age:  73 y.o.  Sex:  male  :  1945  MRN:  [id number]  Visit Number:  [id number]  Primary Care Provider:  Duran Rm APRN      ----------------------------------------------------------------------------------------------------------------------  Subjective       Chief Complaints:    Called To ER        Interval History:      Patient resting comfortably.  Nursing staff reports no issues overnight.  Afebrile with no diarrhea.  CRP level improved to 9.12.  Blood cultures showing no growth at this time.    Review of Systems:    Constitutional: no fever, chills and night sweats. No appetite change or unexpected weight change. No fatigue.  Eyes: no eye drainage, itching or redness.  HEENT: no mouth sores, dysphagia or nose bleed.  Respiratory: no for shortness of breath, cough or production of sputum.  Cardiovascular: no chest pain, no palpitations, no orthopnea.  Gastrointestinal: no nausea, vomiting or diarrhea. Positive abdominal tenderness, No hematemesis or rectal bleeding.  Genitourinary: no dysuria or polyuria.  Hematologic/lymphatic: no lymph node abnormalities, no easy bruising or easy bleeding.  Musculoskeletal: no muscle or joint pain.  Skin: No rash and no itching.  Neurological: no loss of consciousness, no seizure, no headache.  Behavioral/Psych: no depression or suicidal ideation.  Endocrine: no hot flashes.  Immunologic: negative.  ----------------------------------------------------------------------------------------------------------------------      Objective       Current [facility] Meds:    aspirin 81 mg Oral Daily   cefdinir 300 mg Oral Q12H   heparin (porcine) 5,000 Units Subcutaneous Q12H   levothyroxine 88 mcg Oral Daily   metroNIDAZOLE 500 mg Oral Q8H   multivitamin 1 tablet Oral Daily   pantoprazole 40 mg Oral BID AC   sodium chloride 3 mL Intravenous Q12H         ----------------------------------------------------------------------------------------------------------------------    Vital Signs:  Temp:  [97.3 °F (36.3 °C)-98.2 °F (36.8 °C)] 98.2 °F (36.8 °C)  Heart Rate:  [53-59] 53  Resp:  [16-18] 16  BP: (133-153)/(61-86) 153/79  Mean Arterial Pressure (Non-Invasive) for the past 24 hrs (Last 3 readings):   Noninvasive MAP (mmHg)   05/02/19 1059 117   05/02/19 0701 116   05/01/19 1905 106     SpO2 Percentage    05/02/19 0209 05/02/19 0701 05/02/19 1059   SpO2: 90% 95% 96%     SpO2:  [90 %-96 %] 96 %  on   ;   Device (Oxygen Therapy): room air    Body mass index is 31.29 kg/m².  Wt Readings from Last 3 Encounters:   05/02/19 108 kg (237 lb 3.2 oz)   04/26/19 109 kg (240 lb)   12/12/18 109 kg (240 lb)        Intake/Output Summary (Last 24 hours) at 5/2/2019 1129  Last data filed at 5/2/2019 0500  Gross per 24 hour   Intake 600 ml   Output 800 ml   Net -200 ml     Diet Regular; Consistent Carbohydrate  ----------------------------------------------------------------------------------------------------------------------    Physical exam:      Constitutional:  Well-developed and well-nourished.  No respiratory distress.      HENT:  Head: Normocephalic and atraumatic.  Mouth:  Moist mucous membranes.    Eyes:  Conjunctivae and EOM are normal.  No scleral icterus.  Neck:  Neck supple.  No JVD present.    Cardiovascular:  Normal rate, regular rhythm and normal heart sounds with no murmur. No edema.  Pulmonary/Chest:  No respiratory distress, no wheezes, no crackles, with normal breath sounds and good air movement.  Abdominal:  Soft.  Bowel sounds are normal.  No distension. Positive tenderness.   Musculoskeletal:  No edema, no tenderness, and no deformity.  No swelling or redness of joints.  Neurological:  Alert and oriented to person, place, and time.  No facial droop.  No slurred speech.   Skin:  Skin is warm and dry.  No rash noted.  No pallor.   Psychiatric:  Normal mood and  affect.  Behavior is normal.     ----------------------------------------------------------------------------------------------------------------------    ----------------------------------------------------------------------------------------------------------------------  Results from last 7 days   Lab Units 04/27/19  1533 04/26/19  2152   TROPONIN T ng/mL <0.010 <0.010           Results from last 7 days   Lab Units 05/02/19  0333 05/01/19  0423 04/30/19  0501 04/30/19  0500 04/29/19  0102  04/27/19  1836 04/27/19  1533 04/26/19  2221   CRP mg/dL 9.12* 13.70* 11.51*  --  17.18*   < >  --  14.73*  --    LACTATE mmol/L  --   --   --   --   --   --   --  1.4 1.9   WBC 10*3/mm3  --  5.62  --  6.47 4.94   < >  --  8.71  --    HEMOGLOBIN g/dL  --  12.4*  --  12.2* 11.6*   < >  --  13.5  --    HEMATOCRIT %  --  37.9  --  36.3* 35.8*   < >  --  41.2  --    MCV fL  --  86.5  --  86.4 88.0   < >  --  88.4  --    MCHC g/dL  --  32.7  --  33.6 32.4   < >  --  32.8  --    PLATELETS 10*3/mm3  --  147  --  137* 129*   < >  --  131*  --    INR   --   --   --  0.99  --   --  1.23*  --   --     < > = values in this interval not displayed.     Results from last 7 days   Lab Units 05/01/19  0423 04/30/19  0501 04/29/19  0102 04/28/19  0312   SODIUM mmol/L 132* 133* 134* 133*   POTASSIUM mmol/L 3.7 4.0 3.5 3.8   MAGNESIUM mg/dL  --   --  1.8 1.5*   CHLORIDE mmol/L 97* 97* 98 96*   CO2 mmol/L 22.5 21.8* 21.8* 23.7   BUN mg/dL 15 16 23 31*   CREATININE mg/dL 1.29* 1.34* 1.50* 2.04*   EGFR IF NONAFRICN AM mL/min/1.73 54* 52* 46* 32*   CALCIUM mg/dL 8.1* 7.9* 8.1* 7.5*   GLUCOSE mg/dL 119* 142* 103* 94   ALBUMIN g/dL 2.74* 2.84* 2.84* 2.83*   BILIRUBIN mg/dL 3.1* 4.8* 4.3* 6.2*   ALK PHOS U/L 187* 181* 159* 156*   AST (SGOT) U/L 74* 54* 33 49*   ALT (SGPT) U/L 79* 76* 83* 117*   Estimated Creatinine Clearance: 64.7 mL/min (A) (by C-G formula based on SCr of 1.29 mg/dL (H)).  No results found for: AMMONIA    Glucose   Date/Time Value Ref  Range Status   05/02/2019 0750 109 70 - 130 mg/dL Final   05/01/2019 2030 121 70 - 130 mg/dL Final   05/01/2019 1613 142 (H) 70 - 130 mg/dL Final   05/01/2019 1109 113 70 - 130 mg/dL Final   05/01/2019 0652 101 70 - 130 mg/dL Final   04/30/2019 1923 142 (H) 70 - 130 mg/dL Final   04/30/2019 1637 146 (H) 70 - 130 mg/dL Final   04/30/2019 1156 128 70 - 130 mg/dL Final     Lab Results   Component Value Date    HGBA1C 6.70 (H) 04/27/2019     Lab Results   Component Value Date    TSH 11.610 (H) 05/02/2019    FREET4 1.52 05/02/2019       Blood Culture   Date Value Ref Range Status   04/27/2019 No growth at 24 hours  Preliminary   04/27/2019 No growth at 24 hours  Preliminary   04/26/2019 Escherichia coli (A)  Final   04/26/2019 Escherichia coli (A)  Final     Urine Culture   Date Value Ref Range Status   04/27/2019 No growth  Final              Pain Management Panel     Pain Management Panel Latest Ref Rng & Units 4/28/2019 2/13/2014    CREATININE UR mg/dL 54.8 -    AMPHETAMINES SCREEN, URINE NEGATIVE - Negative    BARBITURATES SCREEN NEGATIVE - Negative    BENZODIAZEPINE SCREEN, URINE NEGATIVE - Negative    COCAINE SCREEN, URINE NEGATIVE - Negative    METHADONE SCREEN, URINE NEGATIVE - Negative          I have personally reviewed the above laboratory results.   ----------------------------------------------------------------------------------------------------------------------  Imaging Results (last 24 hours)     ** No results found for the last 24 hours. **        I have personally reviewed the above radiology results.   ----------------------------------------------------------------------------------------------------------------------    Assessment/Plan       Assessment/Plan     ASSESSMENT:    1. Acute cholecystitis  2. E. Coli Bacteremia       PLAN:    Patient resting comfortably.  Nursing staff reports no issues overnight.  Afebrile with no diarrhea.  CRP level improved to 9.12.  Blood cultures showing no growth  at this time.    PSA normal. Urine culture finalized as no growth.     Blood cultures from 4/26/19 2 out of 2 sets positive for E. Coli. Chest x-ray unremarkable. CT of abdomen and pelvis reveals gallstones and a questionable 6mm density near the sphincter of Edwin that is possibly an obstructing stone. Ultrasound of gallbladder reveals gallstones and sludge in the gallbladder. Renal ultrasound unremarkable.     Patient's bacteremia origin is likely related to acute cholecystitis.    Recommen to continue Flagyl 500mg PO Q8H and Omnicef 300mg PO BID  through 5/6/19 to allow for intraabdominal coverage. Patient stable from ID standpoint. CRP in AM.      Current Antimicrobials:  Omnicef 300mg PO BID through 5/6/19  Flagyl 500mg PO Q8H through 5/6/19      Code Status:   Code Status and Medical Interventions:   Ordered at: 04/27/19 1757     Code Status:    CPR     Medical Interventions (Level of Support Prior to Arrest):    Full       Nahun Stanton PA-C  05/02/19  11:29 AM

## 2019-05-02 NOTE — PLAN OF CARE
Problem: Pain, Acute (Adult)  Goal: Identify Related Risk Factors and Signs and Symptoms  Outcome: Ongoing (interventions implemented as appropriate)    Goal: Acceptable Pain Control/Comfort Level  Outcome: Ongoing (interventions implemented as appropriate)      Problem: Nausea/Vomiting (Adult)  Goal: Identify Related Risk Factors and Signs and Symptoms  Outcome: Ongoing (interventions implemented as appropriate)    Goal: Symptom Relief  Outcome: Ongoing (interventions implemented as appropriate)    Goal: Adequate Hydration  Outcome: Ongoing (interventions implemented as appropriate)      Problem: Activity Intolerance (Adult)  Goal: Identify Related Risk Factors and Signs and Symptoms  Outcome: Ongoing (interventions implemented as appropriate)    Goal: Activity Tolerance  Outcome: Ongoing (interventions implemented as appropriate)    Goal: Effective Energy Conservation Techniques  Outcome: Ongoing (interventions implemented as appropriate)      Problem: Patient Care Overview  Goal: Plan of Care Review  Outcome: Ongoing (interventions implemented as appropriate)    Goal: Individualization and Mutuality  Outcome: Ongoing (interventions implemented as appropriate)    Goal: Discharge Needs Assessment  Outcome: Ongoing (interventions implemented as appropriate)    Goal: Interprofessional Rounds/Family Conf  Outcome: Ongoing (interventions implemented as appropriate)      Problem: Fall Risk (Adult)  Goal: Identify Related Risk Factors and Signs and Symptoms  Outcome: Ongoing (interventions implemented as appropriate)    Goal: Absence of Fall  Outcome: Ongoing (interventions implemented as appropriate)

## 2019-05-02 NOTE — DISCHARGE SUMMARY
Date of admission: 4/27/2019  Date of discharge: 5/2/2019    Principal diagnosis: E. coli bacteremia associated with cholecystitis cholelithiasis and choledocholithiasis  Secondary diagnosis:  -Cholestatic jaundice  -Hypothyroidism  -Hypertension  -History of coronary disease status post bypass grafting  -Acute kidney injury probably superimposed on chronic kidney disease stage III  -Dehydration  -Chronic hearing loss  -Hypothyroidism under replaced with Synthroid increased here, would recommend recheck TSH free T4 in 4 weeks.  -Abdominal aortic aneurysm 4.1 meters, patient does know about this, he states it is been there 3 years.  I suggested every 6 months or so that his primary follow this.    Procedures:  ERCP with papillotomy and stone extraction  Laparoscopic cholecystectomy    Consultants:  Dr. Higgins general surgery  Dr. Harry gastroenterology  Dr. Cervantes infectious disease    Exam:Vital signs: 153/79, 16, 53, 98.2, monitor has been showing sinus rhythm, lungs have bilateral breath sounds are clear without rhonchi rales or wheezing, heart regular rate and rhythm without murmur rub or gallop.  Chronic hearing loss noted, abdomen is nondistended  bowel sounds are active minimal right upper quadrant tenderness with deep palpation.  Patient has been ambulating around the halls without difficulty, his bowels have moved and he is tolerating his diet.  His bandages are dry from the laparoscopic procedure    Hospital course: Patient was admitted with E. coli bacteremia.  Work-up revealed that this was from a biliary source.  Patient had elevated transaminases, he underwent an ERCP with stone extraction.  Papillotomy was small because of the ampulla being in a diverticula, with time however his transaminases are normalizing as his bilirubin.  Patient after the ERCP you underwent a lap scopic cholecystectomy.  He has progressed well postoperatively and is tolerating a diet with bowels moving as above.  He was on IV  antibiotics for his bacteremia, these have been de-escalated to p.o. antibiotics by infectious disease and he will complete a course of Omnicef and Flagyl at home.  Patient had acute kidney injury on admission he was hydrated and this has improved.  Noted on his home medication list it was listed he was taking an ACE inhibitor but he actually was not taking this.  He does have a history of coronary disease but he runs a pulse in the 50s and 60s therefore beta-blocker was not added nor was the ACE added back.  I have added Norvasc 5 mg a day and he will be followed up with his primary in 1 week.  His diet will be a cardiac diet his condition on discharge is stable and improved please see chart for full details of this visit.    Diet: Cardiac    Follow-up:  Duran Rm 1 week  Dr. Harry 2 weeks  Dr. Higgins 7 to 10 days    Medications:  Norvasc 5 mg a day  Aspirin 81 mg a day  Omnicef 300 mg twice daily  Synthroid 88 mcg daily  Flagyl 500 mg every 8 hours  Pepcid 20 mg twice daily

## 2019-05-03 ENCOUNTER — READMISSION MANAGEMENT (OUTPATIENT)
Dept: CALL CENTER | Facility: HOSPITAL | Age: 74
End: 2019-05-03

## 2019-05-03 LAB
LAB AP CASE REPORT: NORMAL
PATH REPORT.FINAL DX SPEC: NORMAL

## 2019-05-03 NOTE — OUTREACH NOTE
Prep Survey      Responses   Facility patient discharged from?  [facility]   Is patient eligible?  Yes   Discharge diagnosis  E. Coli bacteremia assoc. with cholecystits cholelithiasis and choledocholithiasis, cholestatic jaundice, hypothyroidism, HTN, HX. CAD s/p CABG, ROSITA on CKD III, Dehydration, chronic hearing loss,AAA, S/P ERCP, and lap. cholecystectomy   Does the patient have one of the following disease processes/diagnoses(primary or secondary)?  General Surgery   Does the patient have Home health ordered?  No   Is there a DME ordered?  No   Comments regarding appointments  See AVS   Prep survey completed?  Yes          Rafaela Sanz RN

## 2019-05-06 ENCOUNTER — READMISSION MANAGEMENT (OUTPATIENT)
Dept: CALL CENTER | Facility: HOSPITAL | Age: 74
End: 2019-05-06

## 2019-05-06 NOTE — OUTREACH NOTE
General Surgery Week 1 Survey      Responses   Facility patient discharged from?  [facility]   Does the patient have one of the following disease processes/diagnoses(primary or secondary)?  General Surgery   Is there a successful TCM telephone encounter documented?  No   Week 1 attempt successful?  Yes   Call start time  1113   Call end time  1117   Discharge diagnosis  E. Coli bacteremia assoc. with cholecystits cholelithiasis and choledocholithiasis, cholestatic jaundice, hypothyroidism, HTN, HX. CAD s/p CABG, ROSITA on CKD III, Dehydration, chronic hearing loss,AAA, S/P ERCP, and lap. cholecystectomy   Is patient permission given to speak with other caregiver?  Yes   Person spoke with today (if not patient) and relationship  wife/ Johanna   Meds reviewed with patient/caregiver?  Yes   Is the patient having any side effects they believe may be caused by any medication additions or changes?  No   Does the patient have all medications related to this admission filled (includes all antibiotics, pain medications, etc.)  Yes   Is the patient taking all medications as directed (includes completed medication regime)?  Yes   Does the patient have a follow up appointment scheduled with their surgeon?  Yes   Has the patient kept scheduled appointments due by today?  Yes   Has home health visited the patient within 72 hours of discharge?  N/A   Psychosocial issues?  No   Comments  Wife reports he is doing well. No c/o pain. No fever.    Did the patient receive a copy of their discharge instructions?  Yes   Nursing interventions  Reviewed instructions with patient   What is the patient's perception of their health status since discharge?  Improving   Nursing interventions  Nurse provided patient education   Is the patient /caregiver able to teach back basic post-op care?  Continue use of incentive spirometry at least 1 week post discharge, Practice 'cough and deep breath', Drive as instructed by MD in discharge instructions, Take  showers only when approved by MD-sponge bathe until then, No tub bath, swimming, or hot tub until instructed by MD, Keep incision areas clean,dry and protected, Do not remove steri-strips, Lifting as instructed by MD in discharge instructions   Is the patient/caregiver able to teach back signs and symptoms of incisional infection?  Increased redness, swelling or pain at the incisonal site, Increased drainage or bleeding, Incisional warmth, Pus or odor from incision, Fever   Is the patient/caregiver able to teach back steps to recovery at home?  Set small, achievable goals for return to baseline health, Rest and rebuild strength, gradually increase activity, Make a list of questions for surgeon's appointment   Is the patient/caregiver able to teach back the hierarchy of who to call/visit for symptoms/problems? PCP, Specialist, Home health nurse, Urgent Care, ED, 911  Yes   Week 1 call completed?  Yes          Omer King RN

## 2019-05-13 ENCOUNTER — OFFICE VISIT (OUTPATIENT)
Dept: SURGERY | Facility: CLINIC | Age: 74
End: 2019-05-13

## 2019-05-13 VITALS — WEIGHT: 236 LBS | BODY MASS INDEX: 28.74 KG/M2 | HEIGHT: 76 IN

## 2019-05-13 DIAGNOSIS — Z90.49 STATUS POST LAPAROSCOPIC CHOLECYSTECTOMY: Primary | ICD-10-CM

## 2019-05-13 PROCEDURE — 99024 POSTOP FOLLOW-UP VISIT: CPT | Performed by: SURGERY

## 2019-05-13 NOTE — PROGRESS NOTES
Subjective   Chi Stanton is a 73 y.o. male.     History of Present Illness He is doing well post lap lisa and ERCP. He is eating fine and no abdominal pain.     The following portions of the patient's history were reviewed and updated as appropriate: current medications, past family history, past medical history, past social history, past surgical history and problem list.    Review of Systems    Objective   Physical Exam wounds are ok.     Assessment/Plan   Chi was seen today for post-op follow-up.    Diagnoses and all orders for this visit:    Status post laparoscopic cholecystectomy    return prn

## 2019-05-15 ENCOUNTER — READMISSION MANAGEMENT (OUTPATIENT)
Dept: CALL CENTER | Facility: HOSPITAL | Age: 74
End: 2019-05-15

## 2019-05-15 NOTE — OUTREACH NOTE
General Surgery Week 2 Survey      Responses   Facility patient discharged from?  [facility]   Does the patient have one of the following disease processes/diagnoses(primary or secondary)?  General Surgery   Week 2 attempt successful?  Yes   Call start time  0850   Call end time  0851   Meds reviewed with patient/caregiver?  Yes   Is the patient taking all medications as directed (includes completed medication regime)?  Yes   Has the patient kept scheduled appointments due by today?  Yes   What is the patient's perception of their health status since discharge?  Improving   Week 2 call completed?  Yes   Revoked  No further contact(revokes)-requires comment   Graduated/Revoked comments  back to work, no need for calls, doing well, he states          Татьяна Gustafson, RN

## 2019-05-20 RX ORDER — RAMIPRIL 10 MG/1
CAPSULE ORAL
Qty: 180 CAPSULE | Refills: 0 | Status: SHIPPED | OUTPATIENT
Start: 2019-05-20 | End: 2019-05-21 | Stop reason: HOSPADM

## 2019-05-21 ENCOUNTER — OFFICE VISIT (OUTPATIENT)
Dept: CARDIOLOGY | Facility: CLINIC | Age: 74
End: 2019-05-21

## 2019-05-21 VITALS
SYSTOLIC BLOOD PRESSURE: 135 MMHG | HEART RATE: 86 BPM | HEIGHT: 76 IN | RESPIRATION RATE: 16 BRPM | BODY MASS INDEX: 27.4 KG/M2 | WEIGHT: 225 LBS | OXYGEN SATURATION: 97 % | DIASTOLIC BLOOD PRESSURE: 87 MMHG

## 2019-05-21 DIAGNOSIS — I71.43 ANEURYSM OF INFRARENAL ABDOMINAL AORTA (HCC): ICD-10-CM

## 2019-05-21 DIAGNOSIS — I25.10 ASCVD (ARTERIOSCLEROTIC CARDIOVASCULAR DISEASE): Primary | Chronic | ICD-10-CM

## 2019-05-21 PROCEDURE — 93000 ELECTROCARDIOGRAM COMPLETE: CPT | Performed by: PHYSICIAN ASSISTANT

## 2019-05-21 PROCEDURE — 99213 OFFICE O/P EST LOW 20 MIN: CPT | Performed by: PHYSICIAN ASSISTANT

## 2019-05-21 RX ORDER — CHLORTHALIDONE 25 MG/1
25 TABLET ORAL DAILY
COMMUNITY

## 2019-05-21 RX ORDER — ATORVASTATIN CALCIUM 80 MG/1
80 TABLET, FILM COATED ORAL DAILY
COMMUNITY
End: 2021-01-15 | Stop reason: ALTCHOICE

## 2019-05-21 NOTE — PROGRESS NOTES
Duran Rm, KARTIK  Chi Stanton  1945 05/21/2019    Patient Active Problem List   Diagnosis   • Benign prostatic hyperplasia with urinary obstruction   • Basal cell carcinoma of skin   • Dyslipidemia   • ASCVD (arteriosclerotic cardiovascular disease) s/p CABG in past    • Hypothyroidism   • Benign essential hypertension   • Elevated prostate specific antigen (PSA)   • Gastroesophageal reflux disease   • Esophageal reflux   • Gout   • Hearing loss   • E. coli bacteremia   • Status post laparoscopic cholecystectomy   • Hyperbilirubinemia   • Transaminitis   • Acute renal failure superimposed on stage 3 chronic kidney disease (CMS/HCC)   • Thrombocytopenia (CMS/HCC)   • Obesity (BMI 30-39.9)   • Former smoker   • Infrarenal abdominal aortia aneurysm, 4.1 cm extending into the right common iliac artery      Dear Duran Rm APRN:    Subjective     History of Present Illness:    Chief Complaint   Patient presents with   • Follow-up     6 mos   • Med Management     presented/discrepiencies   • Extremity Weakness     recent choliecystectomy with complications       Chi Stanton is a pleasant 73 y.o. male with a past medical history significant for ASCVD status post CABG 14 years ago, essential hypertension, dyslipidemia, and dyslipidemia.  Patient comes in today for routine cardiology follow-up.     Patient reports that he has been doing well.  He recently had a cholecystectomy and reports that he has been doing well since then.  He admits to me that he did have a longer than anticipated hospital stay due to E. coli bacteremia with biliary source but he has been out for roughly a month now and is getting his strength back every day.  From cardiac standpoint he reports that he thinks he is doing very well he denies any chest pains, shortness of breath, palpitations, dizziness, or syncope.      No Known Allergies:      Current Outpatient Medications:   •  amLODIPine (NORVASC) 5 MG tablet,  "Take 1 tablet by mouth Daily., Disp: 30 tablet, Rfl: 0  •  aspirin (ASPIRIN LOW DOSE) 81 MG tablet, Take 81 mg by mouth Daily., Disp: , Rfl:   •  atorvastatin (LIPITOR) 80 MG tablet, Take 80 mg by mouth Daily., Disp: , Rfl:   •  chlorthalidone (HYGROTON) 25 MG tablet, Take 25 mg by mouth Daily., Disp: , Rfl:   •  famotidine (PEPCID) 20 MG tablet, Take 20 mg by mouth Daily., Disp: , Rfl:   •  levothyroxine (SYNTHROID, LEVOTHROID) 100 MCG tablet, Take 1 tablet by mouth Daily., Disp: 30 tablet, Rfl: 0    The following portions of the patient's history were reviewed and updated as appropriate: allergies, current medications, past family history, past medical history, past social history, past surgical history and problem list.    Social History     Tobacco Use   • Smoking status: Former Smoker     Packs/day: 2.00     Years: 40.00     Pack years: 80.00     Types: Cigarettes     Last attempt to quit:      Years since quittin.3   • Smokeless tobacco: Never Used   • Tobacco comment: QUIT FOR 20 YEARS   Substance Use Topics   • Alcohol use: No   • Drug use: No       Review of Systems   Constitution: Positive for weakness. Negative for malaise/fatigue.   Cardiovascular: Negative for chest pain, dyspnea on exertion, irregular heartbeat, leg swelling and palpitations.   Respiratory: Positive for shortness of breath. Negative for cough.    Hematologic/Lymphatic: Negative for bleeding problem. Does not bruise/bleed easily.   Gastrointestinal: Negative for nausea and vomiting.       Objective   Vitals:    19 1114   BP: 135/87   Pulse: 86   Resp: 16   SpO2: 97%   Weight: 102 kg (225 lb)   Height: 193 cm (76\")     Body mass index is 27.39 kg/m².    Physical Exam   Constitutional: He is oriented to person, place, and time. He appears well-developed and well-nourished. No distress.   HENT:   Head: Normocephalic and atraumatic.   Cardiovascular: Normal rate, regular rhythm, normal heart sounds and intact distal pulses. "   Pulmonary/Chest: Effort normal and breath sounds normal. No respiratory distress.   Musculoskeletal: He exhibits no edema.   Neurological: He is alert and oriented to person, place, and time.   Skin: He is not diaphoretic.       Lab Results   Component Value Date     05/02/2019    K 3.8 05/02/2019     05/02/2019    CO2 21.0 (L) 05/02/2019    BUN 17 05/02/2019    CREATININE 1.15 05/02/2019    GLUCOSE 113 (H) 05/02/2019    CALCIUM 8.7 05/02/2019    AST 44 (H) 05/02/2019    ALT 63 (H) 05/02/2019    ALKPHOS 177 (H) 05/02/2019    LABIL2 1.4 (L) 11/24/2014     Lab Results   Component Value Date    CKTOTAL 82 11/22/2014     Lab Results   Component Value Date    WBC 5.62 05/01/2019    HGB 12.4 (L) 05/01/2019    HCT 37.9 05/01/2019     05/01/2019     Lab Results   Component Value Date    INR 0.99 04/30/2019    INR 1.23 (H) 04/27/2019    INR 0.97 11/21/2014     Lab Results   Component Value Date    MG 1.8 04/29/2019     Lab Results   Component Value Date    TSH 11.610 (H) 05/02/2019    PSA 2.800 04/28/2019    TRIG 217 (H) 03/27/2018    HDL 28 (L) 03/27/2018    LDL 39 03/27/2018      Lab Results   Component Value Date    BNP 54 11/21/2014       During this visit the following were done:  Labs Reviewed [x]    Labs Ordered []    Radiology Reports Reviewed [x]    Radiology Ordered []    PCP Records Reviewed []    Referring Provider Records Reviewed []    ER Records Reviewed []    Hospital Records Reviewed []    History Obtained From Family []    Radiology Images Reviewed []    Other Reviewed []    Records Requested []         ECG 12 Lead  Date/Time: 5/21/2019 11:20 AM  Performed by: Thuan Segundo PA-C  Authorized by: Thuan Segundo PA-C   Comparison: compared with previous ECG   Similar to previous ECG  Rhythm: sinus rhythm  Q waves: II, III and aVF      Clinical impression: abnormal EKG and non-specific ECG  Comments: Artifact present making interpretation difficult but not  uninterpretable            Assessment/Plan    Diagnosis Plan   1. ASCVD (arteriosclerotic cardiovascular disease) s/p CABG in past      2. Infrarenal abdominal aortia aneurysm, 4.1 cm extending into the right common iliac artery               Recommendations:  1. Patient appears to be doing well from cardiac standpoint he has had a eventful last month being hospitalized and having cholecystectomy but he reports that he is doing much better now and is getting his strength back each day.  He is asymptomatic from cardiac standpoint with good blood pressure control.  Continue chlorthalidone, Lipitor, aspirin, and amlodipine.      Return in about 4 months (around 9/21/2019).    As always, I appreciate very much the opportunity to participate in the cardiovascular care of your patients.      With Best Regards,    Thuan Segundo PA-C

## 2019-10-07 ENCOUNTER — OFFICE VISIT (OUTPATIENT)
Dept: CARDIOLOGY | Facility: CLINIC | Age: 74
End: 2019-10-07

## 2019-10-07 VITALS
HEART RATE: 53 BPM | OXYGEN SATURATION: 99 % | SYSTOLIC BLOOD PRESSURE: 132 MMHG | WEIGHT: 235 LBS | HEIGHT: 76 IN | DIASTOLIC BLOOD PRESSURE: 80 MMHG | BODY MASS INDEX: 28.62 KG/M2

## 2019-10-07 DIAGNOSIS — I25.10 ASCVD (ARTERIOSCLEROTIC CARDIOVASCULAR DISEASE): Primary | Chronic | ICD-10-CM

## 2019-10-07 DIAGNOSIS — I10 BENIGN ESSENTIAL HYPERTENSION: Chronic | ICD-10-CM

## 2019-10-07 PROCEDURE — 99213 OFFICE O/P EST LOW 20 MIN: CPT | Performed by: PHYSICIAN ASSISTANT

## 2019-10-07 RX ORDER — RAMIPRIL 10 MG/1
10 CAPSULE ORAL 2 TIMES DAILY
COMMUNITY
End: 2019-10-08 | Stop reason: SDUPTHER

## 2019-10-09 RX ORDER — RAMIPRIL 10 MG/1
CAPSULE ORAL
Qty: 180 CAPSULE | Refills: 0 | Status: SHIPPED | OUTPATIENT
Start: 2019-10-09

## 2020-01-16 ENCOUNTER — OFFICE VISIT (OUTPATIENT)
Dept: CARDIOLOGY | Facility: CLINIC | Age: 75
End: 2020-01-16

## 2020-01-16 ENCOUNTER — HOSPITAL ENCOUNTER (OUTPATIENT)
Dept: GENERAL RADIOLOGY | Facility: HOSPITAL | Age: 75
Discharge: HOME | End: 2020-01-16
Admitting: PHYSICIAN ASSISTANT

## 2020-01-16 VITALS
HEIGHT: 76 IN | OXYGEN SATURATION: 98 % | HEART RATE: 61 BPM | DIASTOLIC BLOOD PRESSURE: 81 MMHG | SYSTOLIC BLOOD PRESSURE: 121 MMHG | BODY MASS INDEX: 29.06 KG/M2 | WEIGHT: 238.6 LBS

## 2020-01-16 DIAGNOSIS — R06.02 SHORTNESS OF BREATH: ICD-10-CM

## 2020-01-16 DIAGNOSIS — I25.10 ASCVD (ARTERIOSCLEROTIC CARDIOVASCULAR DISEASE): Chronic | ICD-10-CM

## 2020-01-16 DIAGNOSIS — I10 BENIGN ESSENTIAL HYPERTENSION: Chronic | ICD-10-CM

## 2020-01-16 DIAGNOSIS — I71.43 ANEURYSM OF INFRARENAL ABDOMINAL AORTA (HCC): Primary | ICD-10-CM

## 2020-01-16 DIAGNOSIS — I20.8 ANGINAL EQUIVALENT (HCC): ICD-10-CM

## 2020-01-16 DIAGNOSIS — E78.5 DYSLIPIDEMIA: Chronic | ICD-10-CM

## 2020-01-16 PROCEDURE — 99214 OFFICE O/P EST MOD 30 MIN: CPT | Performed by: PHYSICIAN ASSISTANT

## 2020-01-16 PROCEDURE — 71046 X-RAY EXAM CHEST 2 VIEWS: CPT | Performed by: RADIOLOGY

## 2020-01-16 PROCEDURE — 71046 X-RAY EXAM CHEST 2 VIEWS: CPT

## 2020-01-16 PROCEDURE — 93000 ELECTROCARDIOGRAM COMPLETE: CPT | Performed by: PHYSICIAN ASSISTANT

## 2020-01-16 NOTE — PROGRESS NOTES
Duran Rm, KARTIK  Chi Stanton  1945 01/16/2020    Patient Active Problem List   Diagnosis   • Benign prostatic hyperplasia with urinary obstruction   • Basal cell carcinoma of skin   • Dyslipidemia   • ASCVD (arteriosclerotic cardiovascular disease) s/p CABG in past    • Hypothyroidism   • Benign essential hypertension   • Elevated prostate specific antigen (PSA)   • Gastroesophageal reflux disease   • Esophageal reflux   • Gout   • Hearing loss   • E. coli bacteremia   • Status post laparoscopic cholecystectomy   • Hyperbilirubinemia   • Transaminitis   • Acute renal failure superimposed on stage 3 chronic kidney disease (CMS/HCC)   • Thrombocytopenia (CMS/HCC)   • Obesity (BMI 30-39.9)   • Former smoker   • Infrarenal abdominal aortia aneurysm, 4.1 cm extending into the right common iliac artery        Dear Duran Rm APRN:    Subjective     History of Present Illness:    Chief Complaint   Patient presents with   • Shortness of Breath     increased SOA   • Med Management     verbal       Chi Stanton is a pleasant 74 y.o. male with a past medical history significant for ASCVD status post CABG 14 years ago, essential hypertension, dyslipidemia, and dyslipidemia.  Patient comes in today for routine cardiology follow-up.     Patient reports a made this appointment due to concern of his increasing shortness of breath over the last several months.  He does deny any chest pains whatsoever.  He does report over the last couple months he does get slightly more short of breath and has to rest more often but denies any orthopnea, pedal edema, or PND.  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 However, he did have to take the rest of the day off after 12:00 pm.     No Known Allergies:      Current Outpatient Medications:   •  amLODIPine (NORVASC) 5 MG tablet, Take 1 tablet by mouth  "Daily., Disp: 30 tablet, Rfl: 0  •  aspirin (ASPIRIN LOW DOSE) 81 MG tablet, Take 81 mg by mouth Daily., Disp: , Rfl:   •  famotidine (PEPCID) 20 MG tablet, Take 20 mg by mouth Daily., Disp: , Rfl:   •  levothyroxine (SYNTHROID, LEVOTHROID) 100 MCG tablet, Take 1 tablet by mouth Daily., Disp: 30 tablet, Rfl: 0  •  ramipril (ALTACE) 10 MG capsule, TAKE 1 CAPSULE TWICE DAILY, Disp: 180 capsule, Rfl: 0  •  atorvastatin (LIPITOR) 80 MG tablet, Take 80 mg by mouth Daily., Disp: , Rfl:   •  chlorthalidone (HYGROTON) 25 MG tablet, Take 25 mg by mouth Daily., Disp: , Rfl:     The following portions of the patient's history were reviewed and updated as appropriate: allergies, current medications, past family history, past medical history, past social history, past surgical history and problem list.    Social History     Tobacco Use   • Smoking status: Former Smoker     Packs/day: 2.00     Years: 40.00     Pack years: 80.00     Types: Cigarettes     Last attempt to quit:      Years since quittin.0   • Smokeless tobacco: Never Used   • Tobacco comment: QUIT FOR 20 YEARS   Substance Use Topics   • Alcohol use: No   • Drug use: No       Review of Systems   Constitution: Positive for malaise/fatigue.   Cardiovascular: Negative for chest pain, dyspnea on exertion and irregular heartbeat.   Respiratory: Positive for shortness of breath. Negative for cough.    Hematologic/Lymphatic: Negative for bleeding problem. Does not bruise/bleed easily.   Gastrointestinal: Negative for nausea and vomiting.   Neurological: Negative for weakness.       Objective   Vitals:    20 1009   BP: 121/81   BP Location: Left arm   Patient Position: Sitting   Cuff Size: Adult   Pulse: 61   SpO2: 98%   Weight: 108 kg (238 lb 9.6 oz)   Height: 193 cm (76\")     Body mass index is 29.04 kg/m².    Physical Exam   Constitutional: He is oriented to person, place, and time. He appears well-developed and well-nourished. No distress.   HENT:   Head: " Normocephalic and atraumatic.   Cardiovascular: Normal rate, regular rhythm and normal heart sounds.   Pulmonary/Chest: Effort normal and breath sounds normal. No respiratory distress.   Musculoskeletal: He exhibits no edema.   Neurological: He is alert and oriented to person, place, and time.   Skin: He is not diaphoretic.       Lab Results   Component Value Date     05/02/2019    K 3.8 05/02/2019     05/02/2019    CO2 21.0 (L) 05/02/2019    BUN 17 05/02/2019    CREATININE 1.15 05/02/2019    GLUCOSE 113 (H) 05/02/2019    CALCIUM 8.7 05/02/2019    AST 44 (H) 05/02/2019    ALT 63 (H) 05/02/2019    ALKPHOS 177 (H) 05/02/2019    LABIL2 1.4 (L) 11/24/2014     Lab Results   Component Value Date    CKTOTAL 82 11/22/2014     Lab Results   Component Value Date    WBC 5.62 05/01/2019    HGB 12.4 (L) 05/01/2019    HCT 37.9 05/01/2019     05/01/2019     Lab Results   Component Value Date    INR 0.99 04/30/2019    INR 1.23 (H) 04/27/2019    INR 0.97 11/21/2014     Lab Results   Component Value Date    MG 1.8 04/29/2019     Lab Results   Component Value Date    TSH 11.610 (H) 05/02/2019    PSA 2.800 04/28/2019    TRIG 217 (H) 03/27/2018    HDL 28 (L) 03/27/2018    LDL 39 03/27/2018      Lab Results   Component Value Date    BNP 54 11/21/2014       During this visit the following were done:  Labs Reviewed [x]    Labs Ordered []    Radiology Reports Reviewed [x]    Radiology Ordered []    PCP Records Reviewed []    Referring Provider Records Reviewed []    ER Records Reviewed []    Hospital Records Reviewed []    History Obtained From Family []    Radiology Images Reviewed []    Other Reviewed []    Records Requested []         ECG 12 Lead  Date/Time: 1/16/2020 10:14 AM  Performed by: Sheree Cook CMA  Authorized by: Thuan Segundo PA-C   Comparison: compared with previous ECG   Similar to previous ECG  Rhythm: sinus rhythm  Q waves: III, aVF and II      Clinical impression: non-specific  ECG            Assessment/Plan    Diagnosis Plan   1. Infrarenal abdominal aortia aneurysm, 4.1 cm extending into the right common iliac artery      2. Benign essential hypertension     3. ASCVD (arteriosclerotic cardiovascular disease) s/p CABG in past      4. Dyslipidemia  Lipid Panel   5. Anginal equivalent (CMS/HCC)  Treadmill Stress Test   6. Shortness of breath  XR Chest 2 View    Full Pulmonary Function Test With Bronchodilator            Recommendations:  1. Given patient's history of CAD with a CABG roughly 15 years ago, will investigate his increased weakness with a treadmill stress test.   2. Will also order a chest x ray and pft to rule out lung etiology for his shortness of breath.   3. Will repeat AAA U/S next follow up as it will be a year after then.     Return in about 3 months (around 4/16/2020).    As always, I appreciate very much the opportunity to participate in the cardiovascular care of your patients.      With Best Regards,    Thuan Segundo PA-C

## 2020-01-22 ENCOUNTER — LAB (OUTPATIENT)
Dept: LAB | Facility: HOSPITAL | Age: 75
End: 2020-01-22

## 2020-01-22 DIAGNOSIS — I25.10 ASCVD (ARTERIOSCLEROTIC CARDIOVASCULAR DISEASE): Chronic | ICD-10-CM

## 2020-01-22 DIAGNOSIS — E78.5 DYSLIPIDEMIA: Chronic | ICD-10-CM

## 2020-01-22 LAB
ANION GAP SERPL CALCULATED.3IONS-SCNC: 11.4 MMOL/L (ref 5–15)
BUN BLD-MCNC: 30 MG/DL (ref 8–23)
BUN/CREAT SERPL: 19.1 (ref 7–25)
CALCIUM SPEC-SCNC: 9.3 MG/DL (ref 8.6–10.5)
CHLORIDE SERPL-SCNC: 98 MMOL/L (ref 98–107)
CHOLEST SERPL-MCNC: 227 MG/DL (ref 0–200)
CO2 SERPL-SCNC: 25.6 MMOL/L (ref 22–29)
CREAT BLD-MCNC: 1.57 MG/DL (ref 0.76–1.27)
GFR SERPL CREATININE-BSD FRML MDRD: 43 ML/MIN/1.73
GLUCOSE BLD-MCNC: 112 MG/DL (ref 65–99)
HDLC SERPL-MCNC: 29 MG/DL (ref 40–60)
LDLC SERPL CALC-MCNC: 125 MG/DL (ref 0–100)
LDLC/HDLC SERPL: 4.32 {RATIO}
POTASSIUM BLD-SCNC: 4.9 MMOL/L (ref 3.5–5.2)
SODIUM BLD-SCNC: 135 MMOL/L (ref 136–145)
TRIGL SERPL-MCNC: 364 MG/DL (ref 0–150)
VLDLC SERPL-MCNC: 72.8 MG/DL (ref 5–40)

## 2020-01-22 PROCEDURE — 36415 COLL VENOUS BLD VENIPUNCTURE: CPT

## 2020-01-22 PROCEDURE — 80061 LIPID PANEL: CPT

## 2020-01-22 PROCEDURE — 80048 BASIC METABOLIC PNL TOTAL CA: CPT

## 2020-01-27 ENCOUNTER — APPOINTMENT (OUTPATIENT)
Dept: CARDIOLOGY | Facility: HOSPITAL | Age: 75
End: 2020-01-27

## 2020-01-27 ENCOUNTER — APPOINTMENT (OUTPATIENT)
Dept: RESPIRATORY THERAPY | Facility: HOSPITAL | Age: 75
End: 2020-01-27

## 2020-01-27 DIAGNOSIS — I71.43 ANEURYSM OF INFRARENAL ABDOMINAL AORTA (HCC): Primary | ICD-10-CM

## 2020-01-27 DIAGNOSIS — I10 BENIGN ESSENTIAL HYPERTENSION: ICD-10-CM

## 2020-02-10 ENCOUNTER — HOSPITAL ENCOUNTER (OUTPATIENT)
Dept: CARDIOLOGY | Facility: HOSPITAL | Age: 75
Discharge: HOME | End: 2020-02-10

## 2020-02-10 ENCOUNTER — HOSPITAL ENCOUNTER (OUTPATIENT)
Dept: RESPIRATORY THERAPY | Facility: HOSPITAL | Age: 75
Discharge: HOME | End: 2020-02-10
Admitting: PHYSICIAN ASSISTANT

## 2020-02-10 VITALS — OXYGEN SATURATION: 98 % | RESPIRATION RATE: 16 BRPM | HEART RATE: 76 BPM

## 2020-02-10 DIAGNOSIS — I20.8 ANGINAL EQUIVALENT (HCC): ICD-10-CM

## 2020-02-10 DIAGNOSIS — R06.02 SHORTNESS OF BREATH: ICD-10-CM

## 2020-02-10 PROCEDURE — 94729 DIFFUSING CAPACITY: CPT

## 2020-02-10 PROCEDURE — 94799 UNLISTED PULMONARY SVC/PX: CPT

## 2020-02-10 PROCEDURE — 94640 AIRWAY INHALATION TREATMENT: CPT

## 2020-02-10 PROCEDURE — 94060 EVALUATION OF WHEEZING: CPT

## 2020-02-10 PROCEDURE — 93017 CV STRESS TEST TRACING ONLY: CPT

## 2020-02-10 PROCEDURE — 94060 EVALUATION OF WHEEZING: CPT | Performed by: INTERNAL MEDICINE

## 2020-02-10 PROCEDURE — 94727 GAS DIL/WSHOT DETER LNG VOL: CPT

## 2020-02-10 PROCEDURE — 94727 GAS DIL/WSHOT DETER LNG VOL: CPT | Performed by: INTERNAL MEDICINE

## 2020-02-10 PROCEDURE — 94729 DIFFUSING CAPACITY: CPT | Performed by: INTERNAL MEDICINE

## 2020-02-10 PROCEDURE — 93018 CV STRESS TEST I&R ONLY: CPT | Performed by: INTERNAL MEDICINE

## 2020-02-10 RX ORDER — ALBUTEROL SULFATE 2.5 MG/3ML
2.5 SOLUTION RESPIRATORY (INHALATION) ONCE
Status: COMPLETED | OUTPATIENT
Start: 2020-02-10 | End: 2020-02-10

## 2020-02-10 RX ADMIN — ALBUTEROL SULFATE 2.5 MG: 2.5 SOLUTION RESPIRATORY (INHALATION) at 14:57

## 2020-02-11 LAB
BH CV STRESS BP STAGE 1: NORMAL
BH CV STRESS BP STAGE 2: NORMAL
BH CV STRESS DURATION MIN STAGE 1: 3
BH CV STRESS DURATION MIN STAGE 2: 3
BH CV STRESS DURATION SEC STAGE 1: 0
BH CV STRESS DURATION SEC STAGE 2: 0
BH CV STRESS GRADE STAGE 1: 10
BH CV STRESS GRADE STAGE 2: 12
BH CV STRESS HR STAGE 1: 146
BH CV STRESS HR STAGE 2: 152
BH CV STRESS METS STAGE 1: 5
BH CV STRESS METS STAGE 2: 7.5
BH CV STRESS PROTOCOL 1: NORMAL
BH CV STRESS RECOVERY BP: NORMAL MMHG
BH CV STRESS RECOVERY HR: 93 BPM
BH CV STRESS SPEED STAGE 1: 1.7
BH CV STRESS SPEED STAGE 2: 2.5
BH CV STRESS STAGE 1: 1
BH CV STRESS STAGE 2: 2
MAXIMAL PREDICTED HEART RATE: 145 BPM
PERCENT MAX PREDICTED HR: 104.83 %
STRESS BASELINE BP: NORMAL MMHG
STRESS BASELINE HR: 75 BPM
STRESS PERCENT HR: 123 %
STRESS POST ESTIMATED WORKLOAD: 7 METS
STRESS POST EXERCISE DUR MIN: 4 MIN
STRESS POST EXERCISE DUR SEC: 0 SEC
STRESS POST PEAK BP: NORMAL MMHG
STRESS POST PEAK HR: 152 BPM
STRESS TARGET HR: 123 BPM

## 2020-06-17 ENCOUNTER — OFFICE VISIT (OUTPATIENT)
Dept: CARDIOLOGY | Facility: CLINIC | Age: 75
End: 2020-06-17

## 2020-06-17 VITALS
BODY MASS INDEX: 29.37 KG/M2 | SYSTOLIC BLOOD PRESSURE: 142 MMHG | DIASTOLIC BLOOD PRESSURE: 84 MMHG | WEIGHT: 241.2 LBS | HEART RATE: 64 BPM | HEIGHT: 76 IN | OXYGEN SATURATION: 98 % | TEMPERATURE: 98.7 F

## 2020-06-17 DIAGNOSIS — I10 BENIGN ESSENTIAL HYPERTENSION: Chronic | ICD-10-CM

## 2020-06-17 DIAGNOSIS — I25.10 ASCVD (ARTERIOSCLEROTIC CARDIOVASCULAR DISEASE): Primary | Chronic | ICD-10-CM

## 2020-06-17 DIAGNOSIS — I71.43 ANEURYSM OF INFRARENAL ABDOMINAL AORTA (HCC): ICD-10-CM

## 2020-06-17 PROCEDURE — 99213 OFFICE O/P EST LOW 20 MIN: CPT | Performed by: PHYSICIAN ASSISTANT

## 2020-06-17 NOTE — PROGRESS NOTES
Duran Rm APRN  Chi Stanton  1945 06/17/2020    Patient Active Problem List   Diagnosis   • Benign prostatic hyperplasia with urinary obstruction   • Basal cell carcinoma of skin   • Dyslipidemia   • ASCVD (arteriosclerotic cardiovascular disease) s/p CABG in past    • Hypothyroidism   • Benign essential hypertension   • Elevated prostate specific antigen (PSA)   • Gastroesophageal reflux disease   • Esophageal reflux   • Gout   • Hearing loss   • E. coli bacteremia   • Status post laparoscopic cholecystectomy   • Hyperbilirubinemia   • Transaminitis   • Acute renal failure superimposed on stage 3 chronic kidney disease (CMS/HCC)   • Thrombocytopenia (CMS/HCC)   • Obesity (BMI 30-39.9)   • Former smoker   • Infrarenal abdominal aortia aneurysm, 4.1 cm extending into the right common iliac artery        Dear Duran Rm APRN:    Subjective     History of Present Illness:    Chief Complaint   Patient presents with   • Results   • ASCVD   • Hypertension   • Dyslipidemia       Chi Stanton is a pleasant 74 y.o. male with a past medical history significant for ASCVD status post CABG 14 years ago, essential hypertension, dyslipidemia, and dyslipidemia.  Patient comes in today for routine cardiology follow-up.     Mr. Stanton reports overall he has been doing okay he does report since last seen he did have one episode of chest pain early in the morning after he woke up he reports that this only lasted 1 to 2 minutes in duration and resolved spontaneously.  He reports since then he has had no further episodes of chest pain she does report he does normal activities of daily living and is currently building a building on his property although he does state he does not do much of the physical exertion he does report that he helps and has not developed any chest pains during this but does get short of breath and weak and will rest frequently.  He does deny any worsening shortness of breath from  his baseline, palpitations, dizziness, or syncope.  Since last visit he did have a stress test which did not show any signs of ischemia and he did hit 104% of his target max heart rate.  He also had a PFT which showed mild restrictive lung disease.    No Known Allergies:      Current Outpatient Medications:   •  amLODIPine (NORVASC) 5 MG tablet, Take 1 tablet by mouth Daily., Disp: 30 tablet, Rfl: 0  •  aspirin (ASPIRIN LOW DOSE) 81 MG tablet, Take 81 mg by mouth Daily., Disp: , Rfl:   •  atorvastatin (LIPITOR) 80 MG tablet, Take 80 mg by mouth Daily., Disp: , Rfl:   •  chlorthalidone (HYGROTON) 25 MG tablet, Take 25 mg by mouth Daily., Disp: , Rfl:   •  famotidine (PEPCID) 20 MG tablet, Take 20 mg by mouth Daily., Disp: , Rfl:   •  levothyroxine (SYNTHROID, LEVOTHROID) 100 MCG tablet, Take 1 tablet by mouth Daily., Disp: 30 tablet, Rfl: 0  •  ramipril (ALTACE) 10 MG capsule, TAKE 1 CAPSULE TWICE DAILY, Disp: 180 capsule, Rfl: 0    The following portions of the patient's history were reviewed and updated as appropriate: allergies, current medications, past family history, past medical history, past social history, past surgical history and problem list.    Social History     Tobacco Use   • Smoking status: Former Smoker     Packs/day: 2.00     Years: 40.00     Pack years: 80.00     Types: Cigarettes     Last attempt to quit:      Years since quittin.4   • Smokeless tobacco: Never Used   • Tobacco comment: QUIT FOR 20 YEARS   Substance Use Topics   • Alcohol use: No   • Drug use: No       Review of Systems   Constitution: Negative for malaise/fatigue.   Cardiovascular: Negative for chest pain, dyspnea on exertion and irregular heartbeat.   Respiratory: Negative for cough and shortness of breath.    Hematologic/Lymphatic: Negative for bleeding problem. Does not bruise/bleed easily.   Gastrointestinal: Negative for nausea and vomiting.   Neurological: Negative for weakness.       Objective   Vitals:     "06/17/20 0912   BP: 142/84   BP Location: Right arm   Pulse: 64   Temp: 98.7 °F (37.1 °C)   SpO2: 98%   Weight: 109 kg (241 lb 3.2 oz)   Height: 193 cm (76\")     Body mass index is 29.36 kg/m².    Physical Exam   Constitutional: He is oriented to person, place, and time. He appears well-developed and well-nourished. No distress.   HENT:   Head: Normocephalic and atraumatic.   Cardiovascular: Normal rate, regular rhythm and normal heart sounds.   Pulmonary/Chest: Effort normal. No respiratory distress. He has wheezes.   Musculoskeletal: He exhibits no edema.   Neurological: He is alert and oriented to person, place, and time.   Skin: He is not diaphoretic.       Lab Results   Component Value Date     (L) 01/22/2020    K 4.9 01/22/2020    CL 98 01/22/2020    CO2 25.6 01/22/2020    BUN 30 (H) 01/22/2020    CREATININE 1.57 (H) 01/22/2020    GLUCOSE 112 (H) 01/22/2020    CALCIUM 9.3 01/22/2020    AST 44 (H) 05/02/2019    ALT 63 (H) 05/02/2019    ALKPHOS 177 (H) 05/02/2019    LABIL2 1.4 (L) 11/24/2014     Lab Results   Component Value Date    CKTOTAL 82 11/22/2014     Lab Results   Component Value Date    WBC 5.62 05/01/2019    HGB 12.4 (L) 05/01/2019    HCT 37.9 05/01/2019     05/01/2019     Lab Results   Component Value Date    INR 0.99 04/30/2019    INR 1.23 (H) 04/27/2019    INR 0.97 11/21/2014     Lab Results   Component Value Date    MG 1.8 04/29/2019     Lab Results   Component Value Date    TSH 11.610 (H) 05/02/2019    PSA 2.800 04/28/2019    TRIG 364 (H) 01/22/2020    HDL 29 (L) 01/22/2020     (H) 01/22/2020      Lab Results   Component Value Date    BNP 54 11/21/2014       During this visit the following were done:  Labs Reviewed [x]    Labs Ordered []    Radiology Reports Reviewed [x]    Radiology Ordered []    PCP Records Reviewed []    Referring Provider Records Reviewed []    ER Records Reviewed []    Hospital Records Reviewed []    History Obtained From Family []    Radiology Images " Reviewed []    Other Reviewed []    Records Requested []       Procedures    Assessment/Plan    Diagnosis Plan   1. ASCVD (arteriosclerotic cardiovascular disease) s/p CABG in past      2. Benign essential hypertension     3. Infrarenal abdominal aortia aneurysm, 4.1 cm extending into the right common iliac artery   US AAA Screen Limited            Recommendations:  1. I discussed results of stress test and PFT with the patient.  2. Overall I do believe he is stable from cardiac standpoint.  He did have one episode of chest pain but has not recurred since and he does perform moderate physical exertion on a regular basis so we will continue to monitor this for now.  I did ask him to call our office if this worsens in any way.  3. He does have known history of infrarenal abdominal aneurysm I will order an ultrasound for routine monitoring.      Return in about 6 months (around 12/17/2020).    As always, I appreciate very much the opportunity to participate in the cardiovascular care of your patients.      With Best Regards,    Thuan Segundo PA-C

## 2020-07-07 ENCOUNTER — HOSPITAL ENCOUNTER (OUTPATIENT)
Dept: ULTRASOUND IMAGING | Facility: HOSPITAL | Age: 75
Discharge: HOME | End: 2020-07-07
Admitting: PHYSICIAN ASSISTANT

## 2020-07-07 DIAGNOSIS — I71.43 ANEURYSM OF INFRARENAL ABDOMINAL AORTA (HCC): ICD-10-CM

## 2020-07-07 PROCEDURE — 76706 US ABDL AORTA SCREEN AAA: CPT | Performed by: RADIOLOGY

## 2020-07-07 PROCEDURE — 76706 US ABDL AORTA SCREEN AAA: CPT

## 2021-01-15 ENCOUNTER — OFFICE VISIT (OUTPATIENT)
Dept: CARDIOLOGY | Facility: CLINIC | Age: 76
End: 2021-01-15

## 2021-01-15 VITALS
BODY MASS INDEX: 30.1 KG/M2 | TEMPERATURE: 96.2 F | DIASTOLIC BLOOD PRESSURE: 80 MMHG | SYSTOLIC BLOOD PRESSURE: 131 MMHG | OXYGEN SATURATION: 98 % | HEIGHT: 76 IN | WEIGHT: 247.2 LBS | HEART RATE: 65 BPM

## 2021-01-15 DIAGNOSIS — I25.10 ASCVD (ARTERIOSCLEROTIC CARDIOVASCULAR DISEASE): Primary | Chronic | ICD-10-CM

## 2021-01-15 PROCEDURE — 99214 OFFICE O/P EST MOD 30 MIN: CPT | Performed by: PHYSICIAN ASSISTANT

## 2021-01-15 PROCEDURE — 93000 ELECTROCARDIOGRAM COMPLETE: CPT | Performed by: PHYSICIAN ASSISTANT

## 2021-01-15 RX ORDER — ATORVASTATIN CALCIUM 40 MG/1
40 TABLET, FILM COATED ORAL DAILY
Qty: 90 TABLET | Refills: 2 | Status: SHIPPED | OUTPATIENT
Start: 2021-01-15 | End: 2021-11-15

## 2021-01-15 RX ORDER — CARVEDILOL 3.12 MG/1
3.12 TABLET ORAL 2 TIMES DAILY
Qty: 60 TABLET | Refills: 2 | Status: SHIPPED | OUTPATIENT
Start: 2021-01-15 | End: 2021-05-06

## 2021-01-15 RX ORDER — LEVOTHYROXINE SODIUM 0.03 MG/1
25 TABLET ORAL DAILY
COMMUNITY

## 2021-01-15 RX ORDER — INDOMETHACIN 50 MG/1
50 CAPSULE ORAL 3 TIMES DAILY PRN
COMMUNITY

## 2021-01-15 NOTE — PROGRESS NOTES
Duran Rm, KARTIK  Chi Stanton  1945  01/15/2021    Patient Active Problem List   Diagnosis   • Benign prostatic hyperplasia with urinary obstruction   • Basal cell carcinoma of skin   • Dyslipidemia   • ASCVD (arteriosclerotic cardiovascular disease) s/p CABG in past    • Hypothyroidism   • Benign essential hypertension   • Elevated prostate specific antigen (PSA)   • Gastroesophageal reflux disease   • Esophageal reflux   • Gout   • Hearing loss   • E. coli bacteremia   • Status post laparoscopic cholecystectomy   • Hyperbilirubinemia   • Transaminitis   • Acute renal failure superimposed on stage 3 chronic kidney disease (CMS/HCC)   • Thrombocytopenia (CMS/HCC)   • Obesity (BMI 30-39.9)   • Former smoker   • Infrarenal abdominal aortia aneurysm, 4.1 cm extending into the right common iliac artery        Dear Duran Rm, KARTIK:    Subjective     History of Present Illness:    Chief Complaint   Patient presents with   • Med Management     bottles.    • Dizziness   • Palpitations       Chi Stanton is a pleasant 75 y.o. male with a past medical history significant for ASCVD status post CABG 14 years ago, infrarenal abdominal aneurysm measuring 3.9 cm, dyslipidemia, essential hypertension.  He comes in today for routine follow-up.    Patient's abdominal ultrasound did reveal 3.9 cm aneurysm which was no change from his previous.  Clinically he denies any symptoms such as chest pains or shortness of breath.  He does appear he is no longer taking either amlodipine or Lipitor he was unaware of this he does report he recently had a lipid panel from his PCP who did start him on fish oil.    No Known Allergies:      Current Outpatient Medications:   •  aspirin (ASPIRIN LOW DOSE) 81 MG tablet, Take 81 mg by mouth Daily., Disp: , Rfl:   •  chlorthalidone (HYGROTON) 25 MG tablet, Take 25 mg by mouth Daily., Disp: , Rfl:   •  famotidine (PEPCID) 20 MG tablet, Take 20 mg by mouth Daily., Disp: ,  "Rfl:   •  indomethacin (INDOCIN) 50 MG capsule, Take 50 mg by mouth 3 (Three) Times a Day As Needed for Mild Pain ., Disp: , Rfl:   •  levothyroxine (SYNTHROID, LEVOTHROID) 25 MCG tablet, Take 25 mcg by mouth Daily., Disp: , Rfl:   •  Omega-3 Fatty Acids (OMEGA 3 PO), Take  by mouth., Disp: , Rfl:   •  ramipril (ALTACE) 10 MG capsule, TAKE 1 CAPSULE TWICE DAILY, Disp: 180 capsule, Rfl: 0  •  VITAMIN D PO, Take  by mouth., Disp: , Rfl:   •  atorvastatin (LIPITOR) 40 MG tablet, Take 1 tablet by mouth Daily., Disp: 90 tablet, Rfl: 2  •  carvedilol (COREG) 3.125 MG tablet, Take 1 tablet by mouth 2 (Two) Times a Day., Disp: 60 tablet, Rfl: 2    The following portions of the patient's history were reviewed and updated as appropriate: allergies, current medications, past family history, past medical history, past social history, past surgical history and problem list.    Social History     Tobacco Use   • Smoking status: Former Smoker     Packs/day: 2.00     Years: 40.00     Pack years: 80.00     Types: Cigarettes     Quit date:      Years since quittin.0   • Smokeless tobacco: Never Used   • Tobacco comment: QUIT FOR 20 YEARS   Substance Use Topics   • Alcohol use: No   • Drug use: No       ROS    Objective   Vitals:    01/15/21 1006   BP: 131/80   BP Location: Right arm   Patient Position: Sitting   Cuff Size: Adult   Pulse: 65   Temp: 96.2 °F (35.7 °C)   TempSrc: Infrared   SpO2: 98%   Weight: 112 kg (247 lb 3.2 oz)   Height: 193 cm (76\")     Body mass index is 30.09 kg/m².    Constitutional:       General: Not in acute distress.     Appearance: Healthy appearance. Well-developed and not in distress. Not diaphoretic.   Eyes:      Conjunctiva/sclera: Conjunctivae normal.      Pupils: Pupils are equal, round, and reactive to light.   HENT:      Head: Normocephalic and atraumatic.   Neck:      Vascular: No carotid bruit or JVD.   Pulmonary:      Effort: Pulmonary effort is normal. No respiratory distress.      " Breath sounds: Normal breath sounds.   Cardiovascular:      Normal rate. Regular rhythm.   Skin:     General: Skin is cool.   Neurological:      Mental Status: Alert, oriented to person, place, and time and oriented to person, place and time.         Lab Results   Component Value Date     (L) 01/22/2020    K 4.9 01/22/2020    CL 98 01/22/2020    CO2 25.6 01/22/2020    BUN 30 (H) 01/22/2020    CREATININE 1.57 (H) 01/22/2020    GLUCOSE 112 (H) 01/22/2020    CALCIUM 9.3 01/22/2020    AST 44 (H) 05/02/2019    ALT 63 (H) 05/02/2019    ALKPHOS 177 (H) 05/02/2019    LABIL2 1.4 (L) 11/24/2014     Lab Results   Component Value Date    CKTOTAL 82 11/22/2014     Lab Results   Component Value Date    WBC 5.62 05/01/2019    HGB 12.4 (L) 05/01/2019    HCT 37.9 05/01/2019     05/01/2019     Lab Results   Component Value Date    INR 0.99 04/30/2019    INR 1.23 (H) 04/27/2019    INR 0.97 11/21/2014     Lab Results   Component Value Date    MG 1.8 04/29/2019     Lab Results   Component Value Date    TSH 11.610 (H) 05/02/2019    PSA 2.800 04/28/2019    TRIG 364 (H) 01/22/2020    HDL 29 (L) 01/22/2020     (H) 01/22/2020      Lab Results   Component Value Date    BNP 54 11/21/2014       During this visit the following were done:  Labs Reviewed [x]    Labs Ordered []    Radiology Reports Reviewed [x]    Radiology Ordered []    PCP Records Reviewed []    Referring Provider Records Reviewed []    ER Records Reviewed []    Hospital Records Reviewed []    History Obtained From Family []    Radiology Images Reviewed []    Other Reviewed []    Records Requested []         ECG 12 Lead    Date/Time: 1/15/2021 9:58 AM  Performed by: Thuan Segundo PA-C  Authorized by: Thuan Segundo PA-C   Comparison: compared with previous ECG   Similar to previous ECG  Rhythm: sinus rhythm  Q waves: II, III and aVF      Clinical impression: non-specific ECG            Assessment/Plan    Diagnosis Plan   1. ASCVD (arteriosclerotic  cardiovascular disease) s/p CABG in past   Comprehensive Metabolic Panel    Lipid Panel            Recommendations:  1. ASCVD  1. Unsure why patient is no longer on Lipitor he was not aware of this medication at all.  I will restart Lipitor 40 mg.  2. We'll also start carvedilol 3.125 mg with plans on titrating up if tolerable given his history of both ASCVD and abdominal aneurysm.  3. I will continue aspirin and ramipril.  2. Infrarenal abdominal aneurysm  1. Discussed results of ultrasound with patient.       No follow-ups on file.    As always, I appreciate very much the opportunity to participate in the cardiovascular care of your patients.      With Best Regards,    Thuan Segundo PA-C

## 2021-02-01 ENCOUNTER — IMMUNIZATION (OUTPATIENT)
Dept: VACCINE CLINIC | Facility: HOSPITAL | Age: 76
End: 2021-02-01

## 2021-02-01 PROCEDURE — 0001A: CPT | Performed by: FAMILY MEDICINE

## 2021-02-01 PROCEDURE — 91300 HC SARSCOV02 VAC 30MCG/0.3ML IM: CPT | Performed by: FAMILY MEDICINE

## 2021-02-22 ENCOUNTER — IMMUNIZATION (OUTPATIENT)
Dept: VACCINE CLINIC | Facility: HOSPITAL | Age: 76
End: 2021-02-22

## 2021-02-22 PROCEDURE — 0002A: CPT | Performed by: INTERNAL MEDICINE

## 2021-02-22 PROCEDURE — 91300 HC SARSCOV02 VAC 30MCG/0.3ML IM: CPT | Performed by: INTERNAL MEDICINE

## 2021-03-10 NOTE — PROGRESS NOTES
Duran Rm, KARTIK  Chi Stanton  1945  10/07/2019    Patient Active Problem List   Diagnosis   • Benign prostatic hyperplasia with urinary obstruction   • Basal cell carcinoma of skin   • Dyslipidemia   • ASCVD (arteriosclerotic cardiovascular disease) s/p CABG in past    • Hypothyroidism   • Benign essential hypertension   • Elevated prostate specific antigen (PSA)   • Gastroesophageal reflux disease   • Esophageal reflux   • Gout   • Hearing loss   • E. coli bacteremia   • Status post laparoscopic cholecystectomy   • Hyperbilirubinemia   • Transaminitis   • Acute renal failure superimposed on stage 3 chronic kidney disease (CMS/HCC)   • Thrombocytopenia (CMS/HCC)   • Obesity (BMI 30-39.9)   • Former smoker   • Infrarenal abdominal aortia aneurysm, 4.1 cm extending into the right common iliac artery        Dear Duran Rm APRN:    Subjective     History of Present Illness:    Chief Complaint   Patient presents with   • Coronary Artery Disease   • Med Management       Chi Stanton is a pleasant 73 y.o. male with a past medical history significant for ASCVD status post CABG 14 years ago, essential hypertension, dyslipidemia, and dyslipidemia.  Patient comes in today for routine cardiology follow-up.      Patient reports he has been doing well.  He denies any chest pains, shortness of breath, palpitations, dizziness, or syncope.  His blood pressure is also well controlled today and reports that it is controlled at home as well.  His abdominal aortic aneurysm was last checked in April 2019.    No Known Allergies:      Current Outpatient Medications:   •  aspirin (ASPIRIN LOW DOSE) 81 MG tablet, Take 81 mg by mouth Daily., Disp: , Rfl:   •  chlorthalidone (HYGROTON) 25 MG tablet, Take 25 mg by mouth Daily., Disp: , Rfl:   •  famotidine (PEPCID) 20 MG tablet, Take 20 mg by mouth Daily., Disp: , Rfl:   •  ramipril (ALTACE) 10 MG capsule, Take 10 mg by mouth 2 (Two) Times a Day., Disp: ,  "Rfl:   •  amLODIPine (NORVASC) 5 MG tablet, Take 1 tablet by mouth Daily., Disp: 30 tablet, Rfl: 0  •  atorvastatin (LIPITOR) 80 MG tablet, Take 80 mg by mouth Daily., Disp: , Rfl:   •  levothyroxine (SYNTHROID, LEVOTHROID) 100 MCG tablet, Take 1 tablet by mouth Daily., Disp: 30 tablet, Rfl: 0    The following portions of the patient's history were reviewed and updated as appropriate: allergies, current medications, past family history, past medical history, past social history, past surgical history and problem list.    Social History     Tobacco Use   • Smoking status: Former Smoker     Packs/day: 2.00     Years: 40.00     Pack years: 80.00     Types: Cigarettes     Last attempt to quit:      Years since quittin.7   • Smokeless tobacco: Never Used   • Tobacco comment: QUIT FOR 20 YEARS   Substance Use Topics   • Alcohol use: No   • Drug use: No       Review of Systems   Constitution: Negative for weakness and malaise/fatigue.   Cardiovascular: Negative for chest pain, dyspnea on exertion and irregular heartbeat.   Respiratory: Negative for cough and shortness of breath.    Hematologic/Lymphatic: Negative for bleeding problem. Does not bruise/bleed easily.   Gastrointestinal: Negative for nausea and vomiting.       Objective   Vitals:    10/07/19 1336   BP: 132/80   BP Location: Right arm   Patient Position: Sitting   Cuff Size: Adult   Pulse: 53   SpO2: 99%   Weight: 107 kg (235 lb)   Height: 193 cm (76\")     Body mass index is 28.61 kg/m².    Physical Exam   Constitutional: He is oriented to person, place, and time. He appears well-developed and well-nourished. No distress.   HENT:   Head: Normocephalic and atraumatic.   Cardiovascular: Normal rate, regular rhythm and normal heart sounds.   Pulmonary/Chest: Effort normal and breath sounds normal. No respiratory distress.   Musculoskeletal: He exhibits no edema.   Neurological: He is alert and oriented to person, place, and time.   Skin: He is not " diaphoretic.       Lab Results   Component Value Date     05/02/2019    K 3.8 05/02/2019     05/02/2019    CO2 21.0 (L) 05/02/2019    BUN 17 05/02/2019    CREATININE 1.15 05/02/2019    GLUCOSE 113 (H) 05/02/2019    CALCIUM 8.7 05/02/2019    AST 44 (H) 05/02/2019    ALT 63 (H) 05/02/2019    ALKPHOS 177 (H) 05/02/2019    LABIL2 1.4 (L) 11/24/2014     Lab Results   Component Value Date    CKTOTAL 82 11/22/2014     Lab Results   Component Value Date    WBC 5.62 05/01/2019    HGB 12.4 (L) 05/01/2019    HCT 37.9 05/01/2019     05/01/2019     Lab Results   Component Value Date    INR 0.99 04/30/2019    INR 1.23 (H) 04/27/2019    INR 0.97 11/21/2014     Lab Results   Component Value Date    MG 1.8 04/29/2019     Lab Results   Component Value Date    TSH 11.610 (H) 05/02/2019    PSA 2.800 04/28/2019    TRIG 217 (H) 03/27/2018    HDL 28 (L) 03/27/2018    LDL 39 03/27/2018      Lab Results   Component Value Date    BNP 54 11/21/2014       During this visit the following were done:  Labs Reviewed [x]    Labs Ordered []    Radiology Reports Reviewed [x]    Radiology Ordered []    PCP Records Reviewed []    Referring Provider Records Reviewed []    ER Records Reviewed []    Hospital Records Reviewed []    History Obtained From Family []    Radiology Images Reviewed []    Other Reviewed []    Records Requested []       Procedures    Assessment/Plan    Diagnosis Plan   1. ASCVD (arteriosclerotic cardiovascular disease) s/p CABG in past   Basic Metabolic Panel    Lipid Panel   2. Benign essential hypertension              Recommendations:  1. I will check a BMP and lipid panel since this is not been done recently.  2. Patient will need his aneurysm rechecked in around April or May.  3. Continue ramipril, atorvastatin, aspirin.      Return in about 6 months (around 4/7/2020).    As always, I appreciate very much the opportunity to participate in the cardiovascular care of your patients.      With Best  Regards,    Thuan Segundo PA-C           Opioid Pregnancy And Lactation Text: These medications can lead to premature delivery and should be avoided during pregnancy. These medications are also present in breast milk in small amounts.

## 2021-05-06 RX ORDER — CARVEDILOL 3.12 MG/1
TABLET ORAL
Qty: 60 TABLET | Refills: 0 | Status: SHIPPED | OUTPATIENT
Start: 2021-05-06 | End: 2021-06-04

## 2021-06-04 RX ORDER — CARVEDILOL 3.12 MG/1
TABLET ORAL
Qty: 60 TABLET | Refills: 0 | Status: SHIPPED | OUTPATIENT
Start: 2021-06-04

## 2021-10-12 ENCOUNTER — OFFICE VISIT (OUTPATIENT)
Dept: SURGERY | Facility: CLINIC | Age: 76
End: 2021-10-12

## 2021-10-12 VITALS
HEART RATE: 65 BPM | SYSTOLIC BLOOD PRESSURE: 110 MMHG | BODY MASS INDEX: 29.1 KG/M2 | DIASTOLIC BLOOD PRESSURE: 70 MMHG | WEIGHT: 239 LBS | HEIGHT: 76 IN

## 2021-10-12 DIAGNOSIS — L98.9 SKIN LESION: Primary | ICD-10-CM

## 2021-10-12 PROCEDURE — 11602 EXC TR-EXT MAL+MARG 1.1-2 CM: CPT | Performed by: SURGERY

## 2021-10-12 PROCEDURE — 99213 OFFICE O/P EST LOW 20 MIN: CPT | Performed by: SURGERY

## 2021-10-13 PROBLEM — L98.9 SKIN LESION: Status: ACTIVE | Noted: 2021-10-13

## 2021-10-13 NOTE — PROGRESS NOTES
Subjective   Chi Stanton is a 75 y.o. male is being seen for consultation today at the request of Duran Rm APRN    Chi Stanton is a 75 y.o. male With red ulcerated skin lesion of the right upper back measuring 1.4 cm in diameter concerning for malignancy present for several months with central ulceration that has not healed.      Past Medical History:   Diagnosis Date   • ASCVD (arteriosclerotic cardiovascular disease) s/p CABG in past  2016   • Basal cell carcinoma of skin 2016   • Benign prostatic hyperplasia with urinary obstruction 2016   • Bradycardia    • Chronic kidney disease (CKD), stage III (moderate) (HCC)    • Elevated prostate specific antigen (PSA) 2018   • Gastroesophageal reflux disease 10/28/2011   • Gout 1/15/2014   • Hearing loss 2012   • [place] (hard of hearing)    • Hyperlipidemia    • Hypertension    • Hypothyroidism 2018       Family History   Problem Relation Age of Onset   • Heart attack Father    • Heart attack Brother    • Heart attack Brother        Social History     Socioeconomic History   • Marital status:    Tobacco Use   • Smoking status: Former Smoker     Packs/day: 2.00     Years: 40.00     Pack years: 80.00     Types: Cigarettes     Quit date:      Years since quittin.7   • Smokeless tobacco: Never Used   • Tobacco comment: QUIT FOR 20 YEARS   Vaping Use   • Vaping Use: Never used   Substance and Sexual Activity   • Alcohol use: No   • Drug use: No   • Sexual activity: Defer       Past Surgical History:   Procedure Laterality Date   • BYPASS GRAFT     • CHOLECYSTECTOMY N/A 2019    Procedure: CHOLECYSTECTOMY LAPAROSCOPIC;  Surgeon: José Luis Higgins MD;  [place] [facility]  Service: General   • CORONARY ARTERY BYPASS GRAFT     • ERCP N/A 2019    Procedure: ENDOSCOPIC RETROGRADE CHOLANGIOPANCREATOGRAPHY;  Surgeon: Wally Harry MD;  [place] [facility];  Service: Gastroenterology   • SKIN CANCER  "EXCISION      Lip, Neck, Arm        Review of Systems   Constitutional: Negative for activity change, appetite change, chills and fever.   HENT: Negative for sore throat and trouble swallowing.    Eyes: Negative for visual disturbance.   Respiratory: Negative for cough and shortness of breath.    Cardiovascular: Negative for chest pain and palpitations.   Gastrointestinal: Negative for abdominal distention, abdominal pain, blood in stool, constipation, diarrhea, nausea and vomiting.   Endocrine: Negative for cold intolerance and heat intolerance.   Genitourinary: Negative for dysuria.   Musculoskeletal: Negative for joint swelling.   Skin: Positive for wound. Negative for color change and rash.   Allergic/Immunologic: Negative for immunocompromised state.   Neurological: Negative for dizziness, seizures, weakness and headaches.   Hematological: Negative for adenopathy. Does not bruise/bleed easily.   Psychiatric/Behavioral: Negative for agitation and confusion.         /70   Pulse 65   Ht 193 cm (75.98\")   Wt 108 kg (239 lb)   BMI 29.10 kg/m²   Objective   Physical Exam  Constitutional:       Appearance: He is well-developed.   HENT:      Head: Normocephalic and atraumatic.   Eyes:      Conjunctiva/sclera: Conjunctivae normal.      Pupils: Pupils are equal, round, and reactive to light.   Neck:      Thyroid: No thyromegaly.      Vascular: No JVD.      Trachea: No tracheal deviation.   Cardiovascular:      Rate and Rhythm: Normal rate and regular rhythm.      Heart sounds: No murmur heard.  No friction rub. No gallop.    Pulmonary:      Effort: Pulmonary effort is normal.      Breath sounds: Normal breath sounds.   Abdominal:      General: There is no distension.      Palpations: Abdomen is soft. There is no hepatomegaly or splenomegaly.      Tenderness: There is no abdominal tenderness.      Hernia: No hernia is present.   Musculoskeletal:         General: No deformity. Normal range of motion.      " Cervical back: Neck supple.   Skin:     General: Skin is warm and dry.      Comments: 1.4 cm right upper back skin lesion with central ulceration and surrounding red change with irregular borders concerning for malignancy   Neurological:      Mental Status: He is alert and oriented to person, place, and time.       Excision of 1.4 cm skin lesion due to central ulceration, failure to heal, concern for malignancy    Right upper back prepped draped usual sterile fashion.  Timeout procedure was performed.  After injection of local anesthetic an elliptical excision with 3 mm margins was made around 1.4 cm lesion in the skin and subcutaneous fat were excised.  The incision was closed in layers with running nylon suture.    Estimated blood loss: 5 mL    Specimens: Skin lesion right upper back    Complications: None      Assessment   Diagnoses and all orders for this visit:    1. Skin lesion (Primary)    Other orders  -     SCANNED - LABS      Chi Stanton is a 75 y.o. male with skin lesion concerning for malignancy on the right upper back excised in the office today.  Follow-up for suture removal and discussion of pathology in 2 weeks.    Patient's Body mass index is 29.1 kg/m². indicating that he is overweight (BMI 25-29.9). Obesity-related health conditions include the following: hypertension and GERD. Obesity is unchanged. BMI is is above average; BMI management plan is completed. We discussed portion control and increasing exercise..

## 2021-10-15 ENCOUNTER — PATIENT ROUNDING (BHMG ONLY) (OUTPATIENT)
Dept: SURGERY | Facility: CLINIC | Age: 76
End: 2021-10-15

## 2021-10-15 NOTE — PROGRESS NOTES
October 15, 2021    Hello, may I speak with Chi Stanton?    My name is Blanca     I am  with [facility]  [address] [place] [address] [phone].    Before we get started may I verify your date of birth? 1945    I am calling to officially welcome you to our practice and ask about your recent visit. Is this a good time to talk? yes    Tell me about your visit with us. What things went well?  Yes it was a great visit.  Dr. Elliott and staff were great.       We're always looking for ways to make our patients' experiences even better. Do you have recommendations on ways we may improve?  no    Overall were you satisfied with your first visit to our practice? yes       I appreciate you taking the time to speak with me today. Is there anything else I can do for you? no      Thank you, and have a great day.

## 2021-10-27 ENCOUNTER — OFFICE VISIT (OUTPATIENT)
Dept: SURGERY | Facility: CLINIC | Age: 76
End: 2021-10-27

## 2021-10-27 VITALS
DIASTOLIC BLOOD PRESSURE: 70 MMHG | HEIGHT: 76 IN | WEIGHT: 240 LBS | BODY MASS INDEX: 29.22 KG/M2 | SYSTOLIC BLOOD PRESSURE: 118 MMHG

## 2021-10-27 DIAGNOSIS — C44.519 BASAL CELL CARCINOMA (BCC) OF SKIN OF OTHER PART OF TORSO: Primary | Chronic | ICD-10-CM

## 2021-10-27 PROCEDURE — 99212 OFFICE O/P EST SF 10 MIN: CPT | Performed by: SURGERY

## 2021-10-29 NOTE — PROGRESS NOTES
Subjective   Chi Stanton is a 75 y.o. male  is here today for follow-up.         Chi Stanton is a 75 y.o. male here for follow up after excisional biopsy of a skin lesion of the right upper back.  Final pathology is consistent with malignancy and is listed below.  Excision margins are negative.  Incision healed well.              Assessment     Diagnoses and all orders for this visit:    1. Basal cell carcinoma (BCC) of skin of other part of torso (Primary)      Chi Stanton is a 75 y.o. male doing well after removal of an ulcerated basal cell carcinoma from the right upper back.  Margins of excision are negative.  Patient will follow-up as needed and sutures have been removed in the office today.

## 2021-11-15 RX ORDER — ATORVASTATIN CALCIUM 40 MG/1
TABLET, FILM COATED ORAL
Qty: 30 TABLET | Refills: 0 | Status: SHIPPED | OUTPATIENT
Start: 2021-11-15

## 2023-04-18 ENCOUNTER — TRANSCRIBE ORDERS (OUTPATIENT)
Dept: ADMINISTRATIVE | Facility: HOSPITAL | Age: 78
End: 2023-04-18
Payer: COMMERCIAL

## 2023-04-18 DIAGNOSIS — N17.9 ACUTE RENAL FAILURE, UNSPECIFIED ACUTE RENAL FAILURE TYPE: Primary | ICD-10-CM

## (undated) DEVICE — ENDOCUT SCISSOR TIP, DISPOSABLE: Brand: RENEW

## (undated) DEVICE — TROCAR: Brand: KII FIOS FIRST ENTRY

## (undated) DEVICE — GW JAG STR .035IN 450CM

## (undated) DEVICE — BNDG ADHS CURAD FLX/FABRC 2X4IN STRL LF

## (undated) DEVICE — ENDOPATH ELECTROSURGERY PROBE PLUS II 5MM RIGHT ANGLE MONOPOLAR ELECTROSURGERY SUCTION AND IRRRIGATION SHAFTS WITH RIGHT ANGLE ELECTRODE - USE ONLY WITH PROBE PLUS II HANDLES: Brand: ENDOPATH

## (undated) DEVICE — HOLDER: Brand: DEROYAL

## (undated) DEVICE — SUT VIC 2/0 UR6 27IN J602H

## (undated) DEVICE — ANTIBACTERIAL UNDYED BRAIDED (POLYGLACTIN 910), SYNTHETIC ABSORBABLE SUTURE: Brand: COATED VICRYL

## (undated) DEVICE — APPL CHLORAPREP W/TINT 26ML ORNG

## (undated) DEVICE — PAD GRND REM POLYHESIVE A/ DISP

## (undated) DEVICE — [HIGH FLOW INSUFFLATOR,  DO NOT USE IF PACKAGE IS DAMAGED,  KEEP DRY,  KEEP AWAY FROM SUNLIGHT,  PROTECT FROM HEAT AND RADIOACTIVE SOURCES.]: Brand: PNEUMOSURE

## (undated) DEVICE — ENDOPATH XCEL BLADELESS TROCARS WITH STABILITY SLEEVES: Brand: ENDOPATH XCEL

## (undated) DEVICE — TRIPLE LUMEN ERCP CANNULA: Brand: TANDEM XL

## (undated) DEVICE — SPHINCTEROTOME: Brand: HYDRATOME RX 44

## (undated) DEVICE — Device: Brand: MEDEX

## (undated) DEVICE — Device: Brand: DEFENDO AIR/WATER/SUCTION AND BIOPSY VALVE

## (undated) DEVICE — RETRIEVAL BALLOON CATHETER: Brand: EXTRACTOR™ PRO RX

## (undated) DEVICE — GW JAG STR .025IN 450CM

## (undated) DEVICE — ST EXT IV SMARTSITE 2PC M LL 2.8ML 20IN

## (undated) DEVICE — COR LAP CHOLE: Brand: MEDLINE INDUSTRIES, INC.

## (undated) DEVICE — 3M™ STERI-STRIP™ REINFORCED ADHESIVE SKIN CLOSURES, R1547, 1/2 IN X 4 IN (12 MM X 100 MM), 6 STRIPS/ENVELOPE: Brand: 3M™ STERI-STRIP™

## (undated) DEVICE — TROCAR: Brand: KII® SLEEVE

## (undated) DEVICE — ENCORE® LATEX MICRO SIZE 7.5, STERILE LATEX POWDER-FREE SURGICAL GLOVE: Brand: ENCORE

## (undated) DEVICE — CYSTO/BLADDER IRRIGATION SET, REGULATING CLAMP

## (undated) DEVICE — ENDOPATH ELECTROSURGERY PROBE PLUS II PISTOL HAND CONTROL PISTOL GRIP HANDLES WITH SUCTION AND IRRRIGATION FOR HAND CONTROL MONOPOLAR ELCTROSURGERY USE ONLY WITH PROBE PLUS II SHAFTS: Brand: ENDOPATH